# Patient Record
Sex: FEMALE | Race: WHITE | NOT HISPANIC OR LATINO | Employment: OTHER
[De-identification: names, ages, dates, MRNs, and addresses within clinical notes are randomized per-mention and may not be internally consistent; named-entity substitution may affect disease eponyms.]

---

## 2019-07-29 ENCOUNTER — RECORDS - HEALTHEAST (OUTPATIENT)
Dept: ADMINISTRATIVE | Facility: OTHER | Age: 54
End: 2019-07-29

## 2020-07-06 ENCOUNTER — TRANSFERRED RECORDS (OUTPATIENT)
Dept: HEALTH INFORMATION MANAGEMENT | Facility: CLINIC | Age: 55
End: 2020-07-06

## 2021-05-28 ENCOUNTER — RECORDS - HEALTHEAST (OUTPATIENT)
Dept: ADMINISTRATIVE | Facility: CLINIC | Age: 56
End: 2021-05-28

## 2023-01-01 ENCOUNTER — TRANSFERRED RECORDS (OUTPATIENT)
Dept: HEALTH INFORMATION MANAGEMENT | Facility: CLINIC | Age: 58
End: 2023-01-01

## 2023-01-01 ENCOUNTER — PATIENT OUTREACH (OUTPATIENT)
Dept: CARE COORDINATION | Facility: CLINIC | Age: 58
End: 2023-01-01
Payer: COMMERCIAL

## 2023-01-01 ENCOUNTER — HOSPITAL ENCOUNTER (INPATIENT)
Facility: CLINIC | Age: 58
LOS: 2 days | Discharge: HOME | End: 2023-10-25
Attending: EMERGENCY MEDICINE | Admitting: HOSPITALIST
Payer: COMMERCIAL

## 2023-01-01 ENCOUNTER — APPOINTMENT (OUTPATIENT)
Dept: CT IMAGING | Facility: CLINIC | Age: 58
End: 2023-01-01
Attending: EMERGENCY MEDICINE
Payer: COMMERCIAL

## 2023-01-01 ENCOUNTER — APPOINTMENT (OUTPATIENT)
Dept: ULTRASOUND IMAGING | Facility: CLINIC | Age: 58
End: 2023-01-01
Attending: EMERGENCY MEDICINE
Payer: COMMERCIAL

## 2023-01-01 ENCOUNTER — OFFICE VISIT (OUTPATIENT)
Dept: FAMILY MEDICINE | Facility: CLINIC | Age: 58
End: 2023-01-01
Payer: COMMERCIAL

## 2023-01-01 ENCOUNTER — APPOINTMENT (OUTPATIENT)
Dept: PHYSICAL THERAPY | Facility: CLINIC | Age: 58
End: 2023-01-01
Attending: INTERNAL MEDICINE
Payer: COMMERCIAL

## 2023-01-01 ENCOUNTER — HOSPITAL ENCOUNTER (EMERGENCY)
Facility: CLINIC | Age: 58
Discharge: HOME | End: 2023-08-07
Attending: EMERGENCY MEDICINE | Admitting: EMERGENCY MEDICINE
Payer: COMMERCIAL

## 2023-01-01 ENCOUNTER — HOSPITAL ENCOUNTER (OUTPATIENT)
Facility: CLINIC | Age: 58
Setting detail: OBSERVATION
Discharge: HOME | End: 2023-11-13
Attending: EMERGENCY MEDICINE | Admitting: EMERGENCY MEDICINE
Payer: COMMERCIAL

## 2023-01-01 ENCOUNTER — APPOINTMENT (OUTPATIENT)
Dept: RADIOLOGY | Facility: CLINIC | Age: 58
End: 2023-01-01
Attending: HOSPITALIST
Payer: COMMERCIAL

## 2023-01-01 VITALS
BODY MASS INDEX: 31.31 KG/M2 | OXYGEN SATURATION: 94 % | SYSTOLIC BLOOD PRESSURE: 78 MMHG | TEMPERATURE: 98.2 F | WEIGHT: 194 LBS | DIASTOLIC BLOOD PRESSURE: 54 MMHG | RESPIRATION RATE: 16 BRPM | HEART RATE: 92 BPM

## 2023-01-01 VITALS
HEART RATE: 85 BPM | BODY MASS INDEX: 31.64 KG/M2 | SYSTOLIC BLOOD PRESSURE: 123 MMHG | DIASTOLIC BLOOD PRESSURE: 68 MMHG | OXYGEN SATURATION: 92 % | RESPIRATION RATE: 16 BRPM | TEMPERATURE: 98.6 F | WEIGHT: 196 LBS

## 2023-01-01 VITALS
BODY MASS INDEX: 32.14 KG/M2 | HEIGHT: 66 IN | DIASTOLIC BLOOD PRESSURE: 78 MMHG | TEMPERATURE: 97.7 F | HEART RATE: 88 BPM | WEIGHT: 200 LBS | RESPIRATION RATE: 16 BRPM | SYSTOLIC BLOOD PRESSURE: 149 MMHG | OXYGEN SATURATION: 96 %

## 2023-01-01 VITALS
WEIGHT: 195.2 LBS | DIASTOLIC BLOOD PRESSURE: 74 MMHG | SYSTOLIC BLOOD PRESSURE: 141 MMHG | HEART RATE: 100 BPM | TEMPERATURE: 98.4 F | BODY MASS INDEX: 31.51 KG/M2 | RESPIRATION RATE: 18 BRPM | OXYGEN SATURATION: 94 %

## 2023-01-01 DIAGNOSIS — F32.A DEPRESSION, UNSPECIFIED DEPRESSION TYPE: ICD-10-CM

## 2023-01-01 DIAGNOSIS — R31.9 HEMATURIA, UNSPECIFIED TYPE: ICD-10-CM

## 2023-01-01 DIAGNOSIS — E51.9 THIAMINE DEFICIENCY: ICD-10-CM

## 2023-01-01 DIAGNOSIS — R41.0 CONFUSION: Primary | ICD-10-CM

## 2023-01-01 DIAGNOSIS — E03.9 HYPOTHYROIDISM, UNSPECIFIED TYPE: ICD-10-CM

## 2023-01-01 DIAGNOSIS — Z65.8 DOMESTIC CONCERNS: ICD-10-CM

## 2023-01-01 DIAGNOSIS — F51.5 NIGHTMARE: ICD-10-CM

## 2023-01-01 DIAGNOSIS — G30.0: ICD-10-CM

## 2023-01-01 DIAGNOSIS — F03.918 DEMENTIA WITH BEHAVIORAL DISTURBANCE (H): ICD-10-CM

## 2023-01-01 DIAGNOSIS — R45.1 AGITATION: Primary | ICD-10-CM

## 2023-01-01 DIAGNOSIS — R07.9 CHEST PAIN, UNSPECIFIED TYPE: ICD-10-CM

## 2023-01-01 DIAGNOSIS — F02.80 ALZHEIMER'S DEMENTIA, UNSPECIFIED DEMENTIA SEVERITY, UNSPECIFIED TIMING OF DEMENTIA ONSET, UNSPECIFIED WHETHER BEHAVIORAL, PSYCHOTIC, OR MOOD DISTURBANCE OR ANXIETY (H): ICD-10-CM

## 2023-01-01 DIAGNOSIS — R10.84 ABDOMINAL PAIN, GENERALIZED: ICD-10-CM

## 2023-01-01 DIAGNOSIS — Z86.59 HISTORY OF DEMENTIA: ICD-10-CM

## 2023-01-01 DIAGNOSIS — F32.1 CURRENT MODERATE EPISODE OF MAJOR DEPRESSIVE DISORDER WITHOUT PRIOR EPISODE (H): Primary | ICD-10-CM

## 2023-01-01 DIAGNOSIS — N30.00 ACUTE CYSTITIS WITHOUT HEMATURIA: ICD-10-CM

## 2023-01-01 DIAGNOSIS — G30.9 ALZHEIMER'S DEMENTIA, UNSPECIFIED DEMENTIA SEVERITY, UNSPECIFIED TIMING OF DEMENTIA ONSET, UNSPECIFIED WHETHER BEHAVIORAL, PSYCHOTIC, OR MOOD DISTURBANCE OR ANXIETY (H): ICD-10-CM

## 2023-01-01 DIAGNOSIS — F02.83: ICD-10-CM

## 2023-01-01 DIAGNOSIS — I95.9 HYPOTENSION, UNSPECIFIED HYPOTENSION TYPE: ICD-10-CM

## 2023-01-01 DIAGNOSIS — R45.1 AGITATION: ICD-10-CM

## 2023-01-01 LAB
A-TOCOPHEROL VIT E SERPL-MCNC: 8.7 MG/L
ALBUMIN SERPL BCG-MCNC: 4.3 G/DL (ref 3.5–5.2)
ALBUMIN SERPL BCG-MCNC: 4.4 G/DL (ref 3.5–5.2)
ALBUMIN UR-MCNC: NEGATIVE MG/DL
ALBUMIN UR-MCNC: NEGATIVE MG/DL
ALP SERPL-CCNC: 85 U/L (ref 35–104)
ALP SERPL-CCNC: 88 U/L (ref 35–104)
ALT SERPL W P-5'-P-CCNC: 13 U/L (ref 0–50)
ALT SERPL W P-5'-P-CCNC: 16 U/L (ref 0–50)
AMMONIA PLAS-SCNC: 17 UMOL/L (ref 11–51)
AMPHETAMINES UR QL SCN: NORMAL
ANION GAP SERPL CALCULATED.3IONS-SCNC: 10 MMOL/L (ref 7–15)
ANION GAP SERPL CALCULATED.3IONS-SCNC: 11 MMOL/L (ref 7–15)
ANION GAP SERPL CALCULATED.3IONS-SCNC: 12 MMOL/L (ref 7–15)
ANION GAP SERPL CALCULATED.3IONS-SCNC: 9 MMOL/L (ref 7–15)
APPEARANCE UR: CLEAR
APPEARANCE UR: CLEAR
AST SERPL W P-5'-P-CCNC: 18 U/L (ref 0–45)
AST SERPL W P-5'-P-CCNC: 36 U/L (ref 0–45)
ATRIAL RATE - MUSE: 103 BPM
ATRIAL RATE - MUSE: 78 BPM
BACTERIA #/AREA URNS HPF: ABNORMAL /HPF
BACTERIA #/AREA URNS HPF: ABNORMAL /HPF
BACTERIA BLD CULT: NO GROWTH
BACTERIA BLD CULT: NO GROWTH
BACTERIA UR CULT: ABNORMAL
BARBITURATES UR QL SCN: NORMAL
BASE EXCESS BLDV CALC-SCNC: 5.1 MMOL/L
BASO+EOS+MONOS # BLD AUTO: ABNORMAL 10*3/UL
BASO+EOS+MONOS NFR BLD AUTO: ABNORMAL %
BASOPHILS # BLD AUTO: 0 10E3/UL (ref 0–0.2)
BASOPHILS # BLD AUTO: 0 10E3/UL (ref 0–0.2)
BASOPHILS NFR BLD AUTO: 0 %
BASOPHILS NFR BLD AUTO: 0 %
BENZODIAZ UR QL SCN: NORMAL
BETA+GAMMA TOCOPHEROL SERPL-MCNC: 0.5 MG/L
BILIRUB SERPL-MCNC: 0.4 MG/DL
BILIRUB SERPL-MCNC: 0.5 MG/DL
BILIRUB UR QL STRIP: NEGATIVE
BILIRUB UR QL STRIP: NEGATIVE
BUN SERPL-MCNC: 15.3 MG/DL (ref 8–23)
BUN SERPL-MCNC: 16 MG/DL (ref 8–23)
BUN SERPL-MCNC: 16.6 MG/DL (ref 8–23)
BUN SERPL-MCNC: 17.2 MG/DL (ref 8–23)
BZE UR QL SCN: NORMAL
CALCIUM SERPL-MCNC: 10.3 MG/DL (ref 8.6–10)
CALCIUM SERPL-MCNC: 9.1 MG/DL (ref 8.6–10)
CALCIUM SERPL-MCNC: 9.5 MG/DL (ref 8.6–10)
CALCIUM SERPL-MCNC: 9.5 MG/DL (ref 8.6–10)
CANNABINOIDS UR QL SCN: NORMAL
CHLORIDE SERPL-SCNC: 103 MMOL/L (ref 98–107)
CHLORIDE SERPL-SCNC: 103 MMOL/L (ref 98–107)
CHLORIDE SERPL-SCNC: 104 MMOL/L (ref 98–107)
CHLORIDE SERPL-SCNC: 107 MMOL/L (ref 98–107)
COLOR UR AUTO: COLORLESS
COLOR UR AUTO: COLORLESS
CREAT SERPL-MCNC: 1 MG/DL (ref 0.51–0.95)
CREAT SERPL-MCNC: 1.01 MG/DL (ref 0.51–0.95)
CREAT SERPL-MCNC: 1.08 MG/DL (ref 0.51–0.95)
CREAT SERPL-MCNC: 1.09 MG/DL (ref 0.51–0.95)
CREAT SERPL-MCNC: 1.09 MG/DL (ref 0.51–0.95)
CREAT SERPL-MCNC: 1.1 MG/DL (ref 0.51–0.95)
CREAT SERPL-MCNC: 1.14 MG/DL (ref 0.51–0.95)
CRP SERPL-MCNC: 11.4 MG/L
CRP SERPL-MCNC: 11.6 MG/L
CRP SERPL-MCNC: 23.1 MG/L
DEPRECATED HCO3 PLAS-SCNC: 27 MMOL/L (ref 22–29)
DEPRECATED HCO3 PLAS-SCNC: 27 MMOL/L (ref 22–29)
DEPRECATED HCO3 PLAS-SCNC: 28 MMOL/L (ref 22–29)
DEPRECATED HCO3 PLAS-SCNC: 29 MMOL/L (ref 22–29)
DIASTOLIC BLOOD PRESSURE - MUSE: 76 MMHG
DIASTOLIC BLOOD PRESSURE - MUSE: 86 MMHG
EGFRCR SERPLBLD CKD-EPI 2021: 55 ML/MIN/1.73M2
EGFRCR SERPLBLD CKD-EPI 2021: 58 ML/MIN/1.73M2
EGFRCR SERPLBLD CKD-EPI 2021: 59 ML/MIN/1.73M2
EGFRCR SERPLBLD CKD-EPI 2021: 64 ML/MIN/1.73M2
EGFRCR SERPLBLD CKD-EPI 2021: 65 ML/MIN/1.73M2
EOSINOPHIL # BLD AUTO: 0 10E3/UL (ref 0–0.7)
EOSINOPHIL # BLD AUTO: 0 10E3/UL (ref 0–0.7)
EOSINOPHIL NFR BLD AUTO: 0 %
EOSINOPHIL NFR BLD AUTO: 0 %
ERYTHROCYTE [DISTWIDTH] IN BLOOD BY AUTOMATED COUNT: 15.1 % (ref 10–15)
ERYTHROCYTE [DISTWIDTH] IN BLOOD BY AUTOMATED COUNT: 15.2 % (ref 10–15)
ERYTHROCYTE [DISTWIDTH] IN BLOOD BY AUTOMATED COUNT: 15.3 % (ref 10–15)
ERYTHROCYTE [SEDIMENTATION RATE] IN BLOOD BY WESTERGREN METHOD: 12 MM/HR (ref 0–30)
ERYTHROCYTE [SEDIMENTATION RATE] IN BLOOD BY WESTERGREN METHOD: 14 MM/HR (ref 0–30)
ETHANOL SERPL-MCNC: <0.01 G/DL
FENTANYL UR QL: NORMAL
FOLATE SERPL-MCNC: 16.9 NG/ML (ref 4.6–34.8)
GLUCOSE BLDC GLUCOMTR-MCNC: 128 MG/DL (ref 70–99)
GLUCOSE SERPL-MCNC: 101 MG/DL (ref 70–99)
GLUCOSE SERPL-MCNC: 110 MG/DL (ref 70–99)
GLUCOSE SERPL-MCNC: 117 MG/DL (ref 70–99)
GLUCOSE SERPL-MCNC: 93 MG/DL (ref 70–99)
GLUCOSE UR STRIP-MCNC: NEGATIVE MG/DL
GLUCOSE UR STRIP-MCNC: NEGATIVE MG/DL
HCO3 BLDV-SCNC: 30 MMOL/L (ref 24–30)
HCT VFR BLD AUTO: 40.1 % (ref 35–47)
HCT VFR BLD AUTO: 40.9 % (ref 35–47)
HCT VFR BLD AUTO: 41.5 % (ref 35–47)
HCYS SERPL-SCNC: 10.6 UMOL/L (ref 4–12)
HGB BLD-MCNC: 12.7 G/DL (ref 11.7–15.7)
HGB BLD-MCNC: 13.1 G/DL (ref 11.7–15.7)
HGB BLD-MCNC: 13.1 G/DL (ref 11.7–15.7)
HGB UR QL STRIP: NEGATIVE
HGB UR QL STRIP: NEGATIVE
HIV 1+2 AB+HIV1 P24 AG SERPL QL IA: NONREACTIVE
HOLD SPECIMEN: NORMAL
IMM GRANULOCYTES # BLD: 0 10E3/UL
IMM GRANULOCYTES # BLD: 0 10E3/UL
IMM GRANULOCYTES NFR BLD: 0 %
IMM GRANULOCYTES NFR BLD: 0 %
INR PPP: 0.86 (ref 0.85–1.15)
INTERPRETATION ECG - MUSE: NORMAL
INTERPRETATION ECG - MUSE: NORMAL
KETONES UR STRIP-MCNC: NEGATIVE MG/DL
KETONES UR STRIP-MCNC: NEGATIVE MG/DL
LACTATE SERPL-SCNC: 0.7 MMOL/L (ref 0.7–2)
LEUKOCYTE ESTERASE UR QL STRIP: NEGATIVE
LEUKOCYTE ESTERASE UR QL STRIP: NEGATIVE
LIPASE SERPL-CCNC: 39 U/L (ref 13–60)
LYMPHOCYTES # BLD AUTO: 1 10E3/UL (ref 0.8–5.3)
LYMPHOCYTES # BLD AUTO: 1.2 10E3/UL (ref 0.8–5.3)
LYMPHOCYTES NFR BLD AUTO: 13 %
LYMPHOCYTES NFR BLD AUTO: 15 %
MCH RBC QN AUTO: 28.4 PG (ref 26.5–33)
MCH RBC QN AUTO: 28.7 PG (ref 26.5–33)
MCH RBC QN AUTO: 28.9 PG (ref 26.5–33)
MCHC RBC AUTO-ENTMCNC: 31.6 G/DL (ref 31.5–36.5)
MCHC RBC AUTO-ENTMCNC: 31.7 G/DL (ref 31.5–36.5)
MCHC RBC AUTO-ENTMCNC: 32 G/DL (ref 31.5–36.5)
MCV RBC AUTO: 90 FL (ref 78–100)
MCV RBC AUTO: 90 FL (ref 78–100)
MCV RBC AUTO: 91 FL (ref 78–100)
MERCURY BLD-MCNC: <2.5 UG/L
METHYLMALONATE SERPL-SCNC: 0.2 UMOL/L (ref 0–0.4)
MONOCYTES # BLD AUTO: 0.5 10E3/UL (ref 0–1.3)
MONOCYTES # BLD AUTO: 0.7 10E3/UL (ref 0–1.3)
MONOCYTES NFR BLD AUTO: 7 %
MONOCYTES NFR BLD AUTO: 8 %
MUCOUS THREADS #/AREA URNS LPF: PRESENT /LPF
NEUTROPHILS # BLD AUTO: 6.1 10E3/UL (ref 1.6–8.3)
NEUTROPHILS # BLD AUTO: 6.2 10E3/UL (ref 1.6–8.3)
NEUTROPHILS NFR BLD AUTO: 78 %
NEUTROPHILS NFR BLD AUTO: 79 %
NITRATE UR QL: NEGATIVE
NITRATE UR QL: POSITIVE
NRBC # BLD AUTO: 0 10E3/UL
NRBC # BLD AUTO: 0 10E3/UL
NRBC BLD AUTO-RTO: 0 /100
NRBC BLD AUTO-RTO: 0 /100
NT-PROBNP SERPL-MCNC: 41 PG/ML (ref 0–900)
OPIATES UR QL SCN: NORMAL
OXYHGB MFR BLDV: 50.1 % (ref 70–75)
P AXIS - MUSE: 62 DEGREES
P AXIS - MUSE: NORMAL DEGREES
PCO2 BLDV: 49 MM HG (ref 35–50)
PCP QUAL URINE (ROCHE): NORMAL
PH BLDV: 7.4 [PH] (ref 7.35–7.45)
PH UR STRIP: 5 [PH] (ref 5–7)
PH UR STRIP: 5.5 [PH] (ref 5–7)
PHOSPHATE SERPL-MCNC: 3.1 MG/DL (ref 2.5–4.5)
PLATELET # BLD AUTO: 185 10E3/UL (ref 150–450)
PLATELET # BLD AUTO: 187 10E3/UL (ref 150–450)
PLATELET # BLD AUTO: 202 10E3/UL (ref 150–450)
PO2 BLDV: 27 MM HG (ref 25–47)
POTASSIUM SERPL-SCNC: 3.8 MMOL/L (ref 3.4–5.3)
POTASSIUM SERPL-SCNC: 3.9 MMOL/L (ref 3.4–5.3)
POTASSIUM SERPL-SCNC: 4.1 MMOL/L (ref 3.4–5.3)
POTASSIUM SERPL-SCNC: 4.8 MMOL/L (ref 3.4–5.3)
PR INTERVAL - MUSE: 118 MS
PR INTERVAL - MUSE: 152 MS
PROT SERPL-MCNC: 7.2 G/DL (ref 6.4–8.3)
PROT SERPL-MCNC: 7.5 G/DL (ref 6.4–8.3)
QRS DURATION - MUSE: 76 MS
QRS DURATION - MUSE: 76 MS
QT - MUSE: 348 MS
QT - MUSE: 366 MS
QTC - MUSE: 417 MS
QTC - MUSE: 455 MS
R AXIS - MUSE: 51 DEGREES
R AXIS - MUSE: 62 DEGREES
RBC # BLD AUTO: 4.43 10E6/UL (ref 3.8–5.2)
RBC # BLD AUTO: 4.53 10E6/UL (ref 3.8–5.2)
RBC # BLD AUTO: 4.62 10E6/UL (ref 3.8–5.2)
RBC URINE: <1 /HPF
RBC URINE: <1 /HPF
SAO2 % BLDV: 51 % (ref 70–75)
SODIUM SERPL-SCNC: 142 MMOL/L (ref 135–145)
SODIUM SERPL-SCNC: 144 MMOL/L (ref 135–145)
SP GR UR STRIP: 1 (ref 1–1.03)
SP GR UR STRIP: 1.01 (ref 1–1.03)
SQUAMOUS EPITHELIAL: 10 /HPF
SQUAMOUS EPITHELIAL: 2 /HPF
SYSTOLIC BLOOD PRESSURE - MUSE: 156 MMHG
SYSTOLIC BLOOD PRESSURE - MUSE: 162 MMHG
T AXIS - MUSE: 53 DEGREES
T AXIS - MUSE: 58 DEGREES
T PALLIDUM AB SER QL: NONREACTIVE
T4 FREE SERPL-MCNC: 1.05 NG/DL (ref 0.9–1.7)
THYROPEROXIDASE AB SERPL-ACNC: 21 IU/ML
TROPONIN T SERPL HS-MCNC: 16 NG/L
TSH SERPL DL<=0.005 MIU/L-ACNC: 3.84 UIU/ML (ref 0.3–4.2)
TSH SERPL DL<=0.005 MIU/L-ACNC: 8.58 UIU/ML (ref 0.3–4.2)
UROBILINOGEN UR STRIP-MCNC: <2 MG/DL
UROBILINOGEN UR STRIP-MCNC: <2 MG/DL
VENTRICULAR RATE- MUSE: 103 BPM
VENTRICULAR RATE- MUSE: 78 BPM
VIT B1 PYROPHOSHATE BLD-SCNC: 211 NMOL/L
VIT B1 SERPL-SCNC: <2 NMOL/L
VIT B12 SERPL-MCNC: 1322 PG/ML (ref 232–1245)
VIT D+METAB SERPL-MCNC: 51 NG/ML (ref 20–50)
WBC # BLD AUTO: 6.3 10E3/UL (ref 4–11)
WBC # BLD AUTO: 7.9 10E3/UL (ref 4–11)
WBC # BLD AUTO: 7.9 10E3/UL (ref 4–11)
WBC URINE: 1 /HPF
WBC URINE: 2 /HPF

## 2023-01-01 PROCEDURE — G0378 HOSPITAL OBSERVATION PER HR: HCPCS

## 2023-01-01 PROCEDURE — 71046 X-RAY EXAM CHEST 2 VIEWS: CPT

## 2023-01-01 PROCEDURE — 250N000011 HC RX IP 250 OP 636: Performed by: EMERGENCY MEDICINE

## 2023-01-01 PROCEDURE — 250N000013 HC RX MED GY IP 250 OP 250 PS 637: Performed by: INTERNAL MEDICINE

## 2023-01-01 PROCEDURE — 36415 COLL VENOUS BLD VENIPUNCTURE: CPT | Performed by: EMERGENCY MEDICINE

## 2023-01-01 PROCEDURE — 250N000013 HC RX MED GY IP 250 OP 250 PS 637: Performed by: EMERGENCY MEDICINE

## 2023-01-01 PROCEDURE — 86140 C-REACTIVE PROTEIN: CPT | Performed by: HOSPITALIST

## 2023-01-01 PROCEDURE — 82805 BLOOD GASES W/O2 SATURATION: CPT | Performed by: EMERGENCY MEDICINE

## 2023-01-01 PROCEDURE — 82140 ASSAY OF AMMONIA: CPT | Performed by: EMERGENCY MEDICINE

## 2023-01-01 PROCEDURE — 97116 GAIT TRAINING THERAPY: CPT | Mod: GP

## 2023-01-01 PROCEDURE — 250N000013 HC RX MED GY IP 250 OP 250 PS 637: Performed by: REGISTERED NURSE

## 2023-01-01 PROCEDURE — 83690 ASSAY OF LIPASE: CPT | Performed by: STUDENT IN AN ORGANIZED HEALTH CARE EDUCATION/TRAINING PROGRAM

## 2023-01-01 PROCEDURE — 82565 ASSAY OF CREATININE: CPT | Performed by: EMERGENCY MEDICINE

## 2023-01-01 PROCEDURE — 99233 SBSQ HOSP IP/OBS HIGH 50: CPT | Performed by: HOSPITALIST

## 2023-01-01 PROCEDURE — 250N000011 HC RX IP 250 OP 636: Mod: JZ | Performed by: STUDENT IN AN ORGANIZED HEALTH CARE EDUCATION/TRAINING PROGRAM

## 2023-01-01 PROCEDURE — 96372 THER/PROPH/DIAG INJ SC/IM: CPT | Performed by: EMERGENCY MEDICINE

## 2023-01-01 PROCEDURE — 36415 COLL VENOUS BLD VENIPUNCTURE: CPT | Performed by: HOSPITALIST

## 2023-01-01 PROCEDURE — 99223 1ST HOSP IP/OBS HIGH 75: CPT | Performed by: HOSPITALIST

## 2023-01-01 PROCEDURE — 99285 EMERGENCY DEPT VISIT HI MDM: CPT | Mod: 25

## 2023-01-01 PROCEDURE — 80048 BASIC METABOLIC PNL TOTAL CA: CPT | Performed by: HOSPITALIST

## 2023-01-01 PROCEDURE — 250N000011 HC RX IP 250 OP 636: Mod: JZ | Performed by: HOSPITALIST

## 2023-01-01 PROCEDURE — 83880 ASSAY OF NATRIURETIC PEPTIDE: CPT | Performed by: STUDENT IN AN ORGANIZED HEALTH CARE EDUCATION/TRAINING PROGRAM

## 2023-01-01 PROCEDURE — 84446 ASSAY OF VITAMIN E: CPT | Performed by: HOSPITALIST

## 2023-01-01 PROCEDURE — 80053 COMPREHEN METABOLIC PANEL: CPT | Performed by: STUDENT IN AN ORGANIZED HEALTH CARE EDUCATION/TRAINING PROGRAM

## 2023-01-01 PROCEDURE — 84439 ASSAY OF FREE THYROXINE: CPT | Performed by: EMERGENCY MEDICINE

## 2023-01-01 PROCEDURE — 83090 ASSAY OF HOMOCYSTEINE: CPT | Performed by: HOSPITALIST

## 2023-01-01 PROCEDURE — 81001 URINALYSIS AUTO W/SCOPE: CPT | Performed by: STUDENT IN AN ORGANIZED HEALTH CARE EDUCATION/TRAINING PROGRAM

## 2023-01-01 PROCEDURE — 99232 SBSQ HOSP IP/OBS MODERATE 35: CPT | Performed by: EMERGENCY MEDICINE

## 2023-01-01 PROCEDURE — 250N000013 HC RX MED GY IP 250 OP 250 PS 637: Performed by: HOSPITALIST

## 2023-01-01 PROCEDURE — 85652 RBC SED RATE AUTOMATED: CPT | Performed by: EMERGENCY MEDICINE

## 2023-01-01 PROCEDURE — 96376 TX/PRO/DX INJ SAME DRUG ADON: CPT

## 2023-01-01 PROCEDURE — 36415 COLL VENOUS BLD VENIPUNCTURE: CPT | Performed by: STUDENT IN AN ORGANIZED HEALTH CARE EDUCATION/TRAINING PROGRAM

## 2023-01-01 PROCEDURE — 93005 ELECTROCARDIOGRAM TRACING: CPT | Performed by: EMERGENCY MEDICINE

## 2023-01-01 PROCEDURE — 70450 CT HEAD/BRAIN W/O DYE: CPT

## 2023-01-01 PROCEDURE — 86376 MICROSOMAL ANTIBODY EACH: CPT | Performed by: HOSPITALIST

## 2023-01-01 PROCEDURE — 85004 AUTOMATED DIFF WBC COUNT: CPT | Performed by: STUDENT IN AN ORGANIZED HEALTH CARE EDUCATION/TRAINING PROGRAM

## 2023-01-01 PROCEDURE — 87389 HIV-1 AG W/HIV-1&-2 AB AG IA: CPT | Performed by: HOSPITALIST

## 2023-01-01 PROCEDURE — 97162 PT EVAL MOD COMPLEX 30 MIN: CPT | Mod: GP

## 2023-01-01 PROCEDURE — 85025 COMPLETE CBC W/AUTO DIFF WBC: CPT | Performed by: EMERGENCY MEDICINE

## 2023-01-01 PROCEDURE — 96374 THER/PROPH/DIAG INJ IV PUSH: CPT

## 2023-01-01 PROCEDURE — 99204 OFFICE O/P NEW MOD 45 MIN: CPT | Performed by: FAMILY MEDICINE

## 2023-01-01 PROCEDURE — 99239 HOSP IP/OBS DSCHRG MGMT >30: CPT | Performed by: INTERNAL MEDICINE

## 2023-01-01 PROCEDURE — 83825 ASSAY OF MERCURY: CPT | Performed by: HOSPITALIST

## 2023-01-01 PROCEDURE — 82077 ASSAY SPEC XCP UR&BREATH IA: CPT | Performed by: EMERGENCY MEDICINE

## 2023-01-01 PROCEDURE — 76705 ECHO EXAM OF ABDOMEN: CPT

## 2023-01-01 PROCEDURE — 84425 ASSAY OF VITAMIN B-1: CPT | Performed by: HOSPITALIST

## 2023-01-01 PROCEDURE — 84484 ASSAY OF TROPONIN QUANT: CPT | Performed by: STUDENT IN AN ORGANIZED HEALTH CARE EDUCATION/TRAINING PROGRAM

## 2023-01-01 PROCEDURE — 82962 GLUCOSE BLOOD TEST: CPT

## 2023-01-01 PROCEDURE — 250N000011 HC RX IP 250 OP 636: Mod: JZ | Performed by: EMERGENCY MEDICINE

## 2023-01-01 PROCEDURE — 84100 ASSAY OF PHOSPHORUS: CPT | Performed by: HOSPITALIST

## 2023-01-01 PROCEDURE — 85014 HEMATOCRIT: CPT | Performed by: HOSPITALIST

## 2023-01-01 PROCEDURE — 83921 ORGANIC ACID SINGLE QUANT: CPT | Performed by: HOSPITALIST

## 2023-01-01 PROCEDURE — 82306 VITAMIN D 25 HYDROXY: CPT | Performed by: EMERGENCY MEDICINE

## 2023-01-01 PROCEDURE — 250N000013 HC RX MED GY IP 250 OP 250 PS 637: Performed by: STUDENT IN AN ORGANIZED HEALTH CARE EDUCATION/TRAINING PROGRAM

## 2023-01-01 PROCEDURE — 81001 URINALYSIS AUTO W/SCOPE: CPT | Performed by: EMERGENCY MEDICINE

## 2023-01-01 PROCEDURE — 250N000011 HC RX IP 250 OP 636: Mod: JZ | Performed by: REGISTERED NURSE

## 2023-01-01 PROCEDURE — 80053 COMPREHEN METABOLIC PANEL: CPT | Performed by: EMERGENCY MEDICINE

## 2023-01-01 PROCEDURE — 99232 SBSQ HOSP IP/OBS MODERATE 35: CPT | Performed by: HOSPITALIST

## 2023-01-01 PROCEDURE — 99222 1ST HOSP IP/OBS MODERATE 55: CPT | Mod: AI | Performed by: REGISTERED NURSE

## 2023-01-01 PROCEDURE — 90791 PSYCH DIAGNOSTIC EVALUATION: CPT

## 2023-01-01 PROCEDURE — 84443 ASSAY THYROID STIM HORMONE: CPT | Performed by: HOSPITALIST

## 2023-01-01 PROCEDURE — 74177 CT ABD & PELVIS W/CONTRAST: CPT

## 2023-01-01 PROCEDURE — 82607 VITAMIN B-12: CPT | Performed by: HOSPITALIST

## 2023-01-01 PROCEDURE — 99232 SBSQ HOSP IP/OBS MODERATE 35: CPT | Performed by: INTERNAL MEDICINE

## 2023-01-01 PROCEDURE — 99232 SBSQ HOSP IP/OBS MODERATE 35: CPT | Performed by: STUDENT IN AN ORGANIZED HEALTH CARE EDUCATION/TRAINING PROGRAM

## 2023-01-01 PROCEDURE — 82746 ASSAY OF FOLIC ACID SERUM: CPT | Performed by: HOSPITALIST

## 2023-01-01 PROCEDURE — 87040 BLOOD CULTURE FOR BACTERIA: CPT | Performed by: STUDENT IN AN ORGANIZED HEALTH CARE EDUCATION/TRAINING PROGRAM

## 2023-01-01 PROCEDURE — 96375 TX/PRO/DX INJ NEW DRUG ADDON: CPT

## 2023-01-01 PROCEDURE — 84443 ASSAY THYROID STIM HORMONE: CPT | Performed by: EMERGENCY MEDICINE

## 2023-01-01 PROCEDURE — 80307 DRUG TEST PRSMV CHEM ANLYZR: CPT | Performed by: EMERGENCY MEDICINE

## 2023-01-01 PROCEDURE — 99222 1ST HOSP IP/OBS MODERATE 55: CPT | Performed by: EMERGENCY MEDICINE

## 2023-01-01 PROCEDURE — 84425 ASSAY OF VITAMIN B-1: CPT | Performed by: EMERGENCY MEDICINE

## 2023-01-01 PROCEDURE — 83605 ASSAY OF LACTIC ACID: CPT | Performed by: STUDENT IN AN ORGANIZED HEALTH CARE EDUCATION/TRAINING PROGRAM

## 2023-01-01 PROCEDURE — 120N000001 HC R&B MED SURG/OB

## 2023-01-01 PROCEDURE — 85652 RBC SED RATE AUTOMATED: CPT | Performed by: HOSPITALIST

## 2023-01-01 PROCEDURE — 99239 HOSP IP/OBS DSCHRG MGMT >30: CPT | Performed by: STUDENT IN AN ORGANIZED HEALTH CARE EDUCATION/TRAINING PROGRAM

## 2023-01-01 PROCEDURE — 99233 SBSQ HOSP IP/OBS HIGH 50: CPT | Performed by: REGISTERED NURSE

## 2023-01-01 PROCEDURE — 86780 TREPONEMA PALLIDUM: CPT | Performed by: HOSPITALIST

## 2023-01-01 PROCEDURE — 87186 SC STD MICRODIL/AGAR DIL: CPT | Performed by: EMERGENCY MEDICINE

## 2023-01-01 PROCEDURE — 85610 PROTHROMBIN TIME: CPT | Performed by: EMERGENCY MEDICINE

## 2023-01-01 RX ORDER — MEMANTINE HYDROCHLORIDE 5 MG/1
2 TABLET ORAL 2 TIMES DAILY
COMMUNITY
Start: 2021-08-18 | End: 2023-01-01

## 2023-01-01 RX ORDER — PRAZOSIN HYDROCHLORIDE 1 MG/1
1 CAPSULE ORAL AT BEDTIME
Qty: 30 CAPSULE | Refills: 3 | Status: SHIPPED | OUTPATIENT
Start: 2023-01-01

## 2023-01-01 RX ORDER — OLANZAPINE 5 MG/1
2.5 TABLET ORAL DAILY PRN
Status: ON HOLD | COMMUNITY
End: 2023-01-01

## 2023-01-01 RX ORDER — UREA 10 %
500 LOTION (ML) TOPICAL DAILY
Status: DISCONTINUED | OUTPATIENT
Start: 2023-01-01 | End: 2023-01-01 | Stop reason: HOSPADM

## 2023-01-01 RX ORDER — ONDANSETRON 2 MG/ML
4 INJECTION INTRAMUSCULAR; INTRAVENOUS EVERY 6 HOURS PRN
Status: DISCONTINUED | OUTPATIENT
Start: 2023-01-01 | End: 2023-01-01 | Stop reason: HOSPADM

## 2023-01-01 RX ORDER — QUETIAPINE FUMARATE 25 MG/1
25 TABLET, FILM COATED ORAL AT BEDTIME
Status: DISCONTINUED | OUTPATIENT
Start: 2023-01-01 | End: 2023-01-01

## 2023-01-01 RX ORDER — DONEPEZIL HYDROCHLORIDE 5 MG/1
5 TABLET, FILM COATED ORAL AT BEDTIME
Status: DISCONTINUED | OUTPATIENT
Start: 2023-01-01 | End: 2023-01-01 | Stop reason: HOSPADM

## 2023-01-01 RX ORDER — CEPHALEXIN 500 MG/1
500 CAPSULE ORAL EVERY 12 HOURS
Qty: 1 CAPSULE | Refills: 0 | Status: SHIPPED | OUTPATIENT
Start: 2023-01-01 | End: 2023-01-01

## 2023-01-01 RX ORDER — HALOPERIDOL 5 MG/ML
2 INJECTION INTRAMUSCULAR DAILY PRN
Status: DISCONTINUED | OUTPATIENT
Start: 2023-01-01 | End: 2023-01-01

## 2023-01-01 RX ORDER — PRAZOSIN HYDROCHLORIDE 1 MG/1
1 CAPSULE ORAL AT BEDTIME
Status: DISCONTINUED | OUTPATIENT
Start: 2023-01-01 | End: 2023-01-01 | Stop reason: HOSPADM

## 2023-01-01 RX ORDER — LORAZEPAM 2 MG/ML
1 INJECTION INTRAMUSCULAR EVERY 4 HOURS PRN
Status: DISCONTINUED | OUTPATIENT
Start: 2023-01-01 | End: 2023-01-01 | Stop reason: HOSPADM

## 2023-01-01 RX ORDER — ACETAMINOPHEN 325 MG/1
650 TABLET ORAL EVERY 4 HOURS PRN
Status: DISCONTINUED | OUTPATIENT
Start: 2023-01-01 | End: 2023-01-01 | Stop reason: HOSPADM

## 2023-01-01 RX ORDER — OLANZAPINE 5 MG/1
5 TABLET, ORALLY DISINTEGRATING ORAL 2 TIMES DAILY
Status: DISCONTINUED | OUTPATIENT
Start: 2023-01-01 | End: 2023-01-01 | Stop reason: HOSPADM

## 2023-01-01 RX ORDER — BUPROPION HYDROCHLORIDE 150 MG/1
150 TABLET, EXTENDED RELEASE ORAL
COMMUNITY
End: 2023-01-01

## 2023-01-01 RX ORDER — SERTRALINE HYDROCHLORIDE 25 MG/1
25 TABLET, FILM COATED ORAL DAILY
Status: DISCONTINUED | OUTPATIENT
Start: 2023-01-01 | End: 2023-01-01 | Stop reason: HOSPADM

## 2023-01-01 RX ORDER — SERTRALINE HYDROCHLORIDE 25 MG/1
25 TABLET, FILM COATED ORAL DAILY
Qty: 30 TABLET | Refills: 0 | Status: SHIPPED | OUTPATIENT
Start: 2023-01-01

## 2023-01-01 RX ORDER — LANOLIN ALCOHOL/MO/W.PET/CERES
3 CREAM (GRAM) TOPICAL AT BEDTIME
Status: DISCONTINUED | OUTPATIENT
Start: 2023-01-01 | End: 2023-01-01 | Stop reason: HOSPADM

## 2023-01-01 RX ORDER — LORAZEPAM 2 MG/ML
1 INJECTION INTRAMUSCULAR ONCE
Status: COMPLETED | OUTPATIENT
Start: 2023-01-01 | End: 2023-01-01

## 2023-01-01 RX ORDER — LANOLIN ALCOHOL/MO/W.PET/CERES
3 CREAM (GRAM) TOPICAL
Qty: 30 TABLET | Refills: 0 | Status: SHIPPED | OUTPATIENT
Start: 2023-01-01

## 2023-01-01 RX ORDER — B-COMPLEX WITH VITAMIN C
500 TABLET ORAL DAILY
Status: DISCONTINUED | OUTPATIENT
Start: 2023-01-01 | End: 2023-01-01

## 2023-01-01 RX ORDER — ENOXAPARIN SODIUM 100 MG/ML
40 INJECTION SUBCUTANEOUS EVERY 24 HOURS
Status: DISCONTINUED | OUTPATIENT
Start: 2023-01-01 | End: 2023-01-01 | Stop reason: HOSPADM

## 2023-01-01 RX ORDER — POLYETHYLENE GLYCOL 3350 17 G/17G
17 POWDER, FOR SOLUTION ORAL DAILY
Status: DISCONTINUED | OUTPATIENT
Start: 2023-01-01 | End: 2023-01-01 | Stop reason: HOSPADM

## 2023-01-01 RX ORDER — B-COMPLEX WITH VITAMIN C
500 TABLET ORAL DAILY
Qty: 36 TABLET | Refills: 1 | Status: SHIPPED | OUTPATIENT
Start: 2023-01-01 | End: 2023-01-01

## 2023-01-01 RX ORDER — QUETIAPINE FUMARATE 25 MG/1
25 TABLET, FILM COATED ORAL AT BEDTIME
Qty: 45 TABLET | Refills: 3 | Status: SHIPPED | OUTPATIENT
Start: 2023-01-01 | End: 2023-01-01

## 2023-01-01 RX ORDER — IOPAMIDOL 755 MG/ML
75 INJECTION, SOLUTION INTRAVASCULAR ONCE
Status: COMPLETED | OUTPATIENT
Start: 2023-01-01 | End: 2023-01-01

## 2023-01-01 RX ORDER — QUETIAPINE FUMARATE 50 MG/1
50 TABLET, FILM COATED ORAL AT BEDTIME
Status: DISCONTINUED | OUTPATIENT
Start: 2023-01-01 | End: 2023-01-01

## 2023-01-01 RX ORDER — PANTOPRAZOLE SODIUM 40 MG/1
40 TABLET, DELAYED RELEASE ORAL DAILY
Status: DISCONTINUED | OUTPATIENT
Start: 2023-01-01 | End: 2023-01-01 | Stop reason: HOSPADM

## 2023-01-01 RX ORDER — BISACODYL 5 MG
5 TABLET, DELAYED RELEASE (ENTERIC COATED) ORAL DAILY PRN
Status: DISCONTINUED | OUTPATIENT
Start: 2023-01-01 | End: 2023-01-01 | Stop reason: HOSPADM

## 2023-01-01 RX ORDER — ONDANSETRON 4 MG/1
4 TABLET, ORALLY DISINTEGRATING ORAL EVERY 6 HOURS PRN
Status: DISCONTINUED | OUTPATIENT
Start: 2023-01-01 | End: 2023-01-01 | Stop reason: HOSPADM

## 2023-01-01 RX ORDER — VITAMIN B COMPLEX
25 TABLET ORAL DAILY
Status: DISCONTINUED | OUTPATIENT
Start: 2023-01-01 | End: 2023-01-01 | Stop reason: HOSPADM

## 2023-01-01 RX ORDER — OLANZAPINE 5 MG/1
5 TABLET ORAL AT BEDTIME
COMMUNITY

## 2023-01-01 RX ORDER — MEMANTINE HYDROCHLORIDE 5 MG/1
10 TABLET ORAL 2 TIMES DAILY
Status: DISCONTINUED | OUTPATIENT
Start: 2023-01-01 | End: 2023-01-01 | Stop reason: HOSPADM

## 2023-01-01 RX ORDER — OLANZAPINE 5 MG/1
5 TABLET, ORALLY DISINTEGRATING ORAL AT BEDTIME
Status: DISCONTINUED | OUTPATIENT
Start: 2023-01-01 | End: 2023-01-01

## 2023-01-01 RX ORDER — ZIPRASIDONE MESYLATE 20 MG/ML
10 INJECTION, POWDER, LYOPHILIZED, FOR SOLUTION INTRAMUSCULAR ONCE
Status: COMPLETED | OUTPATIENT
Start: 2023-01-01 | End: 2023-01-01

## 2023-01-01 RX ORDER — DONEPEZIL HYDROCHLORIDE 5 MG/1
5 TABLET, FILM COATED ORAL AT BEDTIME
Qty: 30 TABLET | Refills: 0 | Status: SHIPPED | OUTPATIENT
Start: 2023-01-01

## 2023-01-01 RX ORDER — ASPIRIN 81 MG/1
81 TABLET ORAL DAILY
COMMUNITY

## 2023-01-01 RX ORDER — LANOLIN ALCOHOL/MO/W.PET/CERES
3 CREAM (GRAM) TOPICAL
Qty: 30 TABLET | Refills: 3 | Status: SHIPPED | OUTPATIENT
Start: 2023-01-01

## 2023-01-01 RX ORDER — MIRTAZAPINE 15 MG/1
15 TABLET, FILM COATED ORAL AT BEDTIME
Status: DISCONTINUED | OUTPATIENT
Start: 2023-01-01 | End: 2023-01-01 | Stop reason: HOSPADM

## 2023-01-01 RX ORDER — PANTOPRAZOLE SODIUM 40 MG/1
40 TABLET, DELAYED RELEASE ORAL
Status: DISCONTINUED | OUTPATIENT
Start: 2023-01-01 | End: 2023-01-01 | Stop reason: HOSPADM

## 2023-01-01 RX ORDER — VITAMIN B COMPLEX
25 TABLET ORAL DAILY
Status: DISCONTINUED | OUTPATIENT
Start: 2023-01-01 | End: 2023-01-01

## 2023-01-01 RX ORDER — ASPIRIN 81 MG/1
81 TABLET ORAL DAILY
Status: DISCONTINUED | OUTPATIENT
Start: 2023-01-01 | End: 2023-01-01 | Stop reason: HOSPADM

## 2023-01-01 RX ORDER — OLANZAPINE 10 MG/2ML
2.5 INJECTION, POWDER, FOR SOLUTION INTRAMUSCULAR 2 TIMES DAILY PRN
Status: DISCONTINUED | OUTPATIENT
Start: 2023-01-01 | End: 2023-01-01 | Stop reason: HOSPADM

## 2023-01-01 RX ORDER — MIRTAZAPINE 15 MG/1
15 TABLET, FILM COATED ORAL AT BEDTIME
Status: ON HOLD | COMMUNITY
Start: 2023-01-01 | End: 2023-01-01

## 2023-01-01 RX ORDER — HYDROXYZINE HYDROCHLORIDE 10 MG/1
10 TABLET, FILM COATED ORAL DAILY PRN
Status: COMPLETED | OUTPATIENT
Start: 2023-01-01 | End: 2023-01-01

## 2023-01-01 RX ORDER — OLANZAPINE 2.5 MG/1
2.5 TABLET, FILM COATED ORAL ONCE
Status: COMPLETED | OUTPATIENT
Start: 2023-01-01 | End: 2023-01-01

## 2023-01-01 RX ORDER — OLANZAPINE 5 MG/1
2.5-5 TABLET ORAL 2 TIMES DAILY PRN
Qty: 30 TABLET | Refills: 0 | Status: SHIPPED | OUTPATIENT
Start: 2023-01-01

## 2023-01-01 RX ORDER — LORAZEPAM 2 MG/ML
0.5 INJECTION INTRAMUSCULAR ONCE
Status: DISCONTINUED | OUTPATIENT
Start: 2023-01-01 | End: 2023-01-01

## 2023-01-01 RX ORDER — HYDROXYZINE HYDROCHLORIDE 10 MG/1
10 TABLET, FILM COATED ORAL 2 TIMES DAILY PRN
Status: DISCONTINUED | OUTPATIENT
Start: 2023-01-01 | End: 2023-01-01

## 2023-01-01 RX ORDER — LORAZEPAM 0.5 MG/1
TABLET ORAL
COMMUNITY
Start: 2023-01-01 | End: 2023-01-01

## 2023-01-01 RX ORDER — QUETIAPINE FUMARATE 25 MG/1
12.5 TABLET, FILM COATED ORAL 2 TIMES DAILY
Qty: 15 TABLET | Refills: 3 | Status: SHIPPED | OUTPATIENT
Start: 2023-01-01 | End: 2023-01-01

## 2023-01-01 RX ORDER — HYDROXYZINE HYDROCHLORIDE 10 MG/1
10 TABLET, FILM COATED ORAL
Status: COMPLETED | OUTPATIENT
Start: 2023-01-01 | End: 2023-01-01

## 2023-01-01 RX ORDER — CEPHALEXIN 500 MG/1
500 CAPSULE ORAL EVERY 12 HOURS SCHEDULED
Status: DISCONTINUED | OUTPATIENT
Start: 2023-01-01 | End: 2023-01-01 | Stop reason: HOSPADM

## 2023-01-01 RX ORDER — MIRTAZAPINE 15 MG/1
15 TABLET, FILM COATED ORAL AT BEDTIME
Status: DISCONTINUED | OUTPATIENT
Start: 2023-01-01 | End: 2023-01-01

## 2023-01-01 RX ORDER — OLANZAPINE 10 MG/2ML
2.5 INJECTION, POWDER, FOR SOLUTION INTRAMUSCULAR ONCE
Status: COMPLETED | OUTPATIENT
Start: 2023-01-01 | End: 2023-01-01

## 2023-01-01 RX ORDER — MEMANTINE HYDROCHLORIDE 10 MG/1
10 TABLET ORAL 2 TIMES DAILY
Status: DISCONTINUED | OUTPATIENT
Start: 2023-01-01 | End: 2023-01-01 | Stop reason: HOSPADM

## 2023-01-01 RX ORDER — HALOPERIDOL 5 MG/ML
5 INJECTION INTRAMUSCULAR EVERY 6 HOURS PRN
Status: DISCONTINUED | OUTPATIENT
Start: 2023-01-01 | End: 2023-01-01

## 2023-01-01 RX ORDER — OLANZAPINE 5 MG/1
5 TABLET ORAL AT BEDTIME
Status: DISCONTINUED | OUTPATIENT
Start: 2023-01-01 | End: 2023-01-01 | Stop reason: HOSPADM

## 2023-01-01 RX ORDER — QUETIAPINE FUMARATE 25 MG/1
TABLET, FILM COATED ORAL
Status: ON HOLD | COMMUNITY
End: 2023-01-01

## 2023-01-01 RX ORDER — CEFTRIAXONE 1 G/1
1 INJECTION, POWDER, FOR SOLUTION INTRAMUSCULAR; INTRAVENOUS EVERY 24 HOURS
Status: DISCONTINUED | OUTPATIENT
Start: 2023-01-01 | End: 2023-01-01

## 2023-01-01 RX ORDER — LANOLIN ALCOHOL/MO/W.PET/CERES
3 CREAM (GRAM) TOPICAL
Status: DISCONTINUED | OUTPATIENT
Start: 2023-01-01 | End: 2023-01-01 | Stop reason: HOSPADM

## 2023-01-01 RX ORDER — ACETAMINOPHEN 325 MG/1
975 TABLET ORAL EVERY 8 HOURS
Status: DISCONTINUED | OUTPATIENT
Start: 2023-01-01 | End: 2023-01-01 | Stop reason: HOSPADM

## 2023-01-01 RX ORDER — MEMANTINE HYDROCHLORIDE 10 MG/1
10 TABLET ORAL 2 TIMES DAILY
COMMUNITY

## 2023-01-01 RX ORDER — LANOLIN ALCOHOL/MO/W.PET/CERES
100 CREAM (GRAM) TOPICAL DAILY
Qty: 30 TABLET | Refills: 1 | Status: SHIPPED | OUTPATIENT
Start: 2023-01-01

## 2023-01-01 RX ORDER — UREA 10 %
500 LOTION (ML) TOPICAL DAILY
COMMUNITY

## 2023-01-01 RX ADMIN — QUETIAPINE FUMARATE 12.5 MG: 25 TABLET ORAL at 19:38

## 2023-01-01 RX ADMIN — Medication 25 MCG: at 16:52

## 2023-01-01 RX ADMIN — Medication 25 MCG: at 07:20

## 2023-01-01 RX ADMIN — QUETIAPINE FUMARATE 12.5 MG: 25 TABLET ORAL at 09:33

## 2023-01-01 RX ADMIN — LORAZEPAM 1 MG: 2 INJECTION INTRAMUSCULAR; INTRAVENOUS at 13:11

## 2023-01-01 RX ADMIN — PRAZOSIN HYDROCHLORIDE 1 MG: 1 CAPSULE ORAL at 21:36

## 2023-01-01 RX ADMIN — ACETAMINOPHEN 975 MG: 325 TABLET ORAL at 08:53

## 2023-01-01 RX ADMIN — OLANZAPINE 2.5 MG: 5 TABLET, ORALLY DISINTEGRATING ORAL at 11:23

## 2023-01-01 RX ADMIN — ASPIRIN 81 MG: 81 TABLET, COATED ORAL at 16:52

## 2023-01-01 RX ADMIN — ASPIRIN 81 MG: 81 TABLET, COATED ORAL at 07:20

## 2023-01-01 RX ADMIN — PRAZOSIN HYDROCHLORIDE 1 MG: 1 CAPSULE ORAL at 02:48

## 2023-01-01 RX ADMIN — ACETAMINOPHEN 975 MG: 325 TABLET ORAL at 08:52

## 2023-01-01 RX ADMIN — MIRTAZAPINE 15 MG: 15 TABLET, FILM COATED ORAL at 22:58

## 2023-01-01 RX ADMIN — ACETAMINOPHEN 975 MG: 325 TABLET ORAL at 23:47

## 2023-01-01 RX ADMIN — HALOPERIDOL LACTATE 5 MG: 5 INJECTION, SOLUTION INTRAMUSCULAR at 14:22

## 2023-01-01 RX ADMIN — MEMANTINE 10 MG: 10 TABLET ORAL at 09:33

## 2023-01-01 RX ADMIN — OLANZAPINE 2.5 MG: 10 INJECTION, POWDER, FOR SOLUTION INTRAMUSCULAR at 17:38

## 2023-01-01 RX ADMIN — MEMANTINE 10 MG: 10 TABLET ORAL at 19:25

## 2023-01-01 RX ADMIN — CYANOCOBALAMIN TAB 500 MCG 500 MCG: 500 TAB at 16:53

## 2023-01-01 RX ADMIN — QUETIAPINE FUMARATE 12.5 MG: 25 TABLET ORAL at 07:20

## 2023-01-01 RX ADMIN — QUETIAPINE FUMARATE 12.5 MG: 25 TABLET ORAL at 19:31

## 2023-01-01 RX ADMIN — PANTOPRAZOLE SODIUM 40 MG: 40 TABLET, DELAYED RELEASE ORAL at 09:16

## 2023-01-01 RX ADMIN — DONEPEZIL HYDROCHLORIDE 5 MG: 5 TABLET, FILM COATED ORAL at 22:09

## 2023-01-01 RX ADMIN — MEMANTINE 10 MG: 10 TABLET ORAL at 19:31

## 2023-01-01 RX ADMIN — DONEPEZIL HYDROCHLORIDE 5 MG: 5 TABLET, FILM COATED ORAL at 21:37

## 2023-01-01 RX ADMIN — OLANZAPINE 5 MG: 5 TABLET, ORALLY DISINTEGRATING ORAL at 17:15

## 2023-01-01 RX ADMIN — OLANZAPINE 5 MG: 5 TABLET, ORALLY DISINTEGRATING ORAL at 08:14

## 2023-01-01 RX ADMIN — POLYETHYLENE GLYCOL 3350 17 G: 17 POWDER, FOR SOLUTION ORAL at 09:19

## 2023-01-01 RX ADMIN — MEMANTINE 10 MG: 10 TABLET ORAL at 08:53

## 2023-01-01 RX ADMIN — Medication 3 MG: at 19:32

## 2023-01-01 RX ADMIN — OLANZAPINE 5 MG: 5 TABLET, ORALLY DISINTEGRATING ORAL at 21:36

## 2023-01-01 RX ADMIN — MEMANTINE 10 MG: 10 TABLET ORAL at 08:52

## 2023-01-01 RX ADMIN — OLANZAPINE 2.5 MG: 5 TABLET, ORALLY DISINTEGRATING ORAL at 15:46

## 2023-01-01 RX ADMIN — Medication 25 MCG: at 08:53

## 2023-01-01 RX ADMIN — Medication 100 MG: at 07:59

## 2023-01-01 RX ADMIN — MEMANTINE 10 MG: 10 TABLET ORAL at 08:00

## 2023-01-01 RX ADMIN — CEFTRIAXONE 1 G: 1 INJECTION, POWDER, FOR SOLUTION INTRAMUSCULAR; INTRAVENOUS at 09:03

## 2023-01-01 RX ADMIN — ASPIRIN 81 MG: 81 TABLET, COATED ORAL at 09:17

## 2023-01-01 RX ADMIN — MEMANTINE 10 MG: 10 TABLET ORAL at 21:09

## 2023-01-01 RX ADMIN — QUETIAPINE FUMARATE 12.5 MG: 25 TABLET ORAL at 01:41

## 2023-01-01 RX ADMIN — OLANZAPINE 2.5 MG: 10 INJECTION, POWDER, FOR SOLUTION INTRAMUSCULAR at 17:37

## 2023-01-01 RX ADMIN — PANTOPRAZOLE SODIUM 40 MG: 40 TABLET, DELAYED RELEASE ORAL at 07:59

## 2023-01-01 RX ADMIN — DONEPEZIL HYDROCHLORIDE 5 MG: 5 TABLET, FILM COATED ORAL at 19:25

## 2023-01-01 RX ADMIN — OLANZAPINE 5 MG: 5 TABLET, ORALLY DISINTEGRATING ORAL at 09:00

## 2023-01-01 RX ADMIN — MIRTAZAPINE 15 MG: 15 TABLET, FILM COATED ORAL at 21:09

## 2023-01-01 RX ADMIN — MEMANTINE 10 MG: 10 TABLET ORAL at 20:27

## 2023-01-01 RX ADMIN — Medication 100 MG: at 08:00

## 2023-01-01 RX ADMIN — OLANZAPINE 5 MG: 5 TABLET, ORALLY DISINTEGRATING ORAL at 20:58

## 2023-01-01 RX ADMIN — CEFTRIAXONE 1 G: 1 INJECTION, POWDER, FOR SOLUTION INTRAMUSCULAR; INTRAVENOUS at 09:33

## 2023-01-01 RX ADMIN — Medication 1 MG: at 22:36

## 2023-01-01 RX ADMIN — QUETIAPINE FUMARATE 12.5 MG: 25 TABLET ORAL at 19:19

## 2023-01-01 RX ADMIN — QUETIAPINE FUMARATE 12.5 MG: 25 TABLET ORAL at 08:53

## 2023-01-01 RX ADMIN — QUETIAPINE FUMARATE 12.5 MG: 25 TABLET ORAL at 08:52

## 2023-01-01 RX ADMIN — ACETAMINOPHEN 650 MG: 325 TABLET ORAL at 23:55

## 2023-01-01 RX ADMIN — OLANZAPINE 5 MG: 5 TABLET, FILM COATED ORAL at 22:14

## 2023-01-01 RX ADMIN — CYANOCOBALAMIN TAB 500 MCG 500 MCG: 500 TAB at 08:57

## 2023-01-01 RX ADMIN — ASPIRIN 81 MG: 81 TABLET, COATED ORAL at 08:53

## 2023-01-01 RX ADMIN — MEMANTINE 10 MG: 10 TABLET ORAL at 09:16

## 2023-01-01 RX ADMIN — SERTRALINE HYDROCHLORIDE 25 MG: 25 TABLET ORAL at 08:00

## 2023-01-01 RX ADMIN — Medication 25 MCG: at 09:16

## 2023-01-01 RX ADMIN — OLANZAPINE 5 MG: 5 TABLET, ORALLY DISINTEGRATING ORAL at 08:00

## 2023-01-01 RX ADMIN — PRAZOSIN HYDROCHLORIDE 1 MG: 1 CAPSULE ORAL at 20:59

## 2023-01-01 RX ADMIN — ACETAMINOPHEN 975 MG: 325 TABLET ORAL at 15:35

## 2023-01-01 RX ADMIN — MEMANTINE 10 MG: 10 TABLET ORAL at 19:40

## 2023-01-01 RX ADMIN — LORAZEPAM 1 MG: 2 INJECTION INTRAMUSCULAR; INTRAVENOUS at 15:51

## 2023-01-01 RX ADMIN — CYANOCOBALAMIN TAB 500 MCG 500 MCG: 500 TAB at 09:02

## 2023-01-01 RX ADMIN — POLYETHYLENE GLYCOL 3350 17 G: 17 POWDER, FOR SOLUTION ORAL at 09:33

## 2023-01-01 RX ADMIN — LORAZEPAM 1 MG: 2 INJECTION INTRAMUSCULAR; INTRAVENOUS at 04:54

## 2023-01-01 RX ADMIN — OLANZAPINE 5 MG: 5 TABLET, ORALLY DISINTEGRATING ORAL at 17:34

## 2023-01-01 RX ADMIN — CYANOCOBALAMIN TAB 500 MCG 500 MCG: 500 TAB at 09:18

## 2023-01-01 RX ADMIN — MIRTAZAPINE 15 MG: 15 TABLET, FILM COATED ORAL at 22:36

## 2023-01-01 RX ADMIN — MEMANTINE 10 MG: 10 TABLET ORAL at 09:30

## 2023-01-01 RX ADMIN — LORAZEPAM 1 MG: 2 INJECTION INTRAMUSCULAR; INTRAVENOUS at 19:40

## 2023-01-01 RX ADMIN — MEMANTINE 10 MG: 10 TABLET ORAL at 20:46

## 2023-01-01 RX ADMIN — ACETAMINOPHEN 975 MG: 325 TABLET ORAL at 16:52

## 2023-01-01 RX ADMIN — ASPIRIN 81 MG: 81 TABLET, COATED ORAL at 09:33

## 2023-01-01 RX ADMIN — MEMANTINE 10 MG: 10 TABLET ORAL at 07:58

## 2023-01-01 RX ADMIN — MEMANTINE 10 MG: 10 TABLET ORAL at 09:00

## 2023-01-01 RX ADMIN — QUETIAPINE FUMARATE 12.5 MG: 25 TABLET ORAL at 09:16

## 2023-01-01 RX ADMIN — ACETAMINOPHEN 975 MG: 325 TABLET ORAL at 07:22

## 2023-01-01 RX ADMIN — PANTOPRAZOLE SODIUM 40 MG: 40 TABLET, DELAYED RELEASE ORAL at 06:30

## 2023-01-01 RX ADMIN — MEMANTINE 10 MG: 10 TABLET ORAL at 19:21

## 2023-01-01 RX ADMIN — PANTOPRAZOLE SODIUM 40 MG: 40 TABLET, DELAYED RELEASE ORAL at 09:33

## 2023-01-01 RX ADMIN — OLANZAPINE 5 MG: 5 TABLET, ORALLY DISINTEGRATING ORAL at 02:48

## 2023-01-01 RX ADMIN — IOPAMIDOL 75 ML: 755 INJECTION, SOLUTION INTRAVENOUS at 18:15

## 2023-01-01 RX ADMIN — PRAZOSIN HYDROCHLORIDE 1 MG: 1 CAPSULE ORAL at 22:09

## 2023-01-01 RX ADMIN — OLANZAPINE 2.5 MG: 5 TABLET, ORALLY DISINTEGRATING ORAL at 20:01

## 2023-01-01 RX ADMIN — Medication 25 MCG: at 09:33

## 2023-01-01 RX ADMIN — ACETAMINOPHEN 975 MG: 325 TABLET ORAL at 09:33

## 2023-01-01 RX ADMIN — Medication 3 MG: at 20:27

## 2023-01-01 RX ADMIN — PANTOPRAZOLE SODIUM 40 MG: 40 TABLET, DELAYED RELEASE ORAL at 09:29

## 2023-01-01 RX ADMIN — QUETIAPINE FUMARATE 12.5 MG: 25 TABLET ORAL at 21:09

## 2023-01-01 RX ADMIN — LORAZEPAM 1 MG: 2 INJECTION INTRAMUSCULAR; INTRAVENOUS at 15:15

## 2023-01-01 RX ADMIN — PANTOPRAZOLE SODIUM 40 MG: 40 TABLET, DELAYED RELEASE ORAL at 07:22

## 2023-01-01 RX ADMIN — LORAZEPAM 1 MG: 2 INJECTION INTRAMUSCULAR; INTRAVENOUS at 03:27

## 2023-01-01 RX ADMIN — QUETIAPINE FUMARATE 12.5 MG: 25 TABLET ORAL at 20:27

## 2023-01-01 RX ADMIN — Medication 100 MG: at 09:29

## 2023-01-01 RX ADMIN — HALOPERIDOL LACTATE 2 MG: 5 INJECTION, SOLUTION INTRAMUSCULAR at 12:57

## 2023-01-01 RX ADMIN — SERTRALINE HYDROCHLORIDE 25 MG: 25 TABLET ORAL at 17:34

## 2023-01-01 RX ADMIN — PANTOPRAZOLE SODIUM 40 MG: 40 TABLET, DELAYED RELEASE ORAL at 06:52

## 2023-01-01 RX ADMIN — OLANZAPINE 2.5 MG: 5 TABLET, ORALLY DISINTEGRATING ORAL at 13:14

## 2023-01-01 RX ADMIN — ACETAMINOPHEN 975 MG: 325 TABLET ORAL at 09:16

## 2023-01-01 RX ADMIN — ACETAMINOPHEN 975 MG: 325 TABLET ORAL at 23:00

## 2023-01-01 RX ADMIN — MEMANTINE 10 MG: 10 TABLET ORAL at 07:20

## 2023-01-01 RX ADMIN — MEMANTINE 10 MG: 10 TABLET ORAL at 20:58

## 2023-01-01 RX ADMIN — PANTOPRAZOLE SODIUM 40 MG: 40 TABLET, DELAYED RELEASE ORAL at 08:00

## 2023-01-01 RX ADMIN — CEPHALEXIN 500 MG: 500 CAPSULE ORAL at 09:16

## 2023-01-01 RX ADMIN — CEFTRIAXONE 1 G: 1 INJECTION, POWDER, FOR SOLUTION INTRAMUSCULAR; INTRAVENOUS at 09:34

## 2023-01-01 RX ADMIN — LORAZEPAM 1 MG: 2 INJECTION INTRAMUSCULAR; INTRAVENOUS at 23:57

## 2023-01-01 RX ADMIN — LORAZEPAM 1 MG: 2 INJECTION INTRAMUSCULAR; INTRAVENOUS at 16:19

## 2023-01-01 RX ADMIN — ENOXAPARIN SODIUM 40 MG: 100 INJECTION SUBCUTANEOUS at 11:13

## 2023-01-01 RX ADMIN — Medication 3 MG: at 21:09

## 2023-01-01 RX ADMIN — MEMANTINE 10 MG: 10 TABLET ORAL at 20:01

## 2023-01-01 RX ADMIN — LORAZEPAM 1 MG: 2 INJECTION INTRAMUSCULAR; INTRAVENOUS at 19:54

## 2023-01-01 RX ADMIN — HYDROXYZINE HYDROCHLORIDE 10 MG: 10 TABLET ORAL at 19:39

## 2023-01-01 RX ADMIN — HYDROXYZINE HYDROCHLORIDE 10 MG: 10 TABLET ORAL at 15:35

## 2023-01-01 RX ADMIN — CEFTRIAXONE 1 G: 1 INJECTION, POWDER, FOR SOLUTION INTRAMUSCULAR; INTRAVENOUS at 08:53

## 2023-01-01 RX ADMIN — ACETAMINOPHEN 975 MG: 325 TABLET ORAL at 00:41

## 2023-01-01 RX ADMIN — PANTOPRAZOLE SODIUM 40 MG: 40 TABLET, DELAYED RELEASE ORAL at 09:03

## 2023-01-01 RX ADMIN — QUETIAPINE FUMARATE 12.5 MG: 25 TABLET ORAL at 20:46

## 2023-01-01 RX ADMIN — CYANOCOBALAMIN TAB 500 MCG 500 MCG: 500 TAB at 09:04

## 2023-01-01 RX ADMIN — Medication 100 MG: at 09:03

## 2023-01-01 RX ADMIN — ZIPRASIDONE MESYLATE 10 MG: 20 INJECTION, POWDER, LYOPHILIZED, FOR SOLUTION INTRAMUSCULAR at 18:26

## 2023-01-01 RX ADMIN — Medication 1 MG: at 22:58

## 2023-01-01 ASSESSMENT — ACTIVITIES OF DAILY LIVING (ADL)
ADLS_ACUITY_SCORE: 44
ADLS_ACUITY_SCORE: 37
ADLS_ACUITY_SCORE: 37
ADLS_ACUITY_SCORE: 42
ADLS_ACUITY_SCORE: 40
ADLS_ACUITY_SCORE: 40
ADLS_ACUITY_SCORE: 37
ADLS_ACUITY_SCORE: 36
ADLS_ACUITY_SCORE: 36
ADLS_ACUITY_SCORE: 45
ADLS_ACUITY_SCORE: 40
ADLS_ACUITY_SCORE: 40
ADLS_ACUITY_SCORE: 37
ADLS_ACUITY_SCORE: 42
ADLS_ACUITY_SCORE: 44
ADLS_ACUITY_SCORE: 36
ADLS_ACUITY_SCORE: 44
ADLS_ACUITY_SCORE: 44
ADLS_ACUITY_SCORE: 42
ADLS_ACUITY_SCORE: 37
ADLS_ACUITY_SCORE: 42
ADLS_ACUITY_SCORE: 44
ADLS_ACUITY_SCORE: 40
ADLS_ACUITY_SCORE: 42
ADLS_ACUITY_SCORE: 37
ADLS_ACUITY_SCORE: 36
ADLS_ACUITY_SCORE: 44
ADLS_ACUITY_SCORE: 42
ADLS_ACUITY_SCORE: 35
ADLS_ACUITY_SCORE: 35
ADLS_ACUITY_SCORE: 37
ADLS_ACUITY_SCORE: 37
ADLS_ACUITY_SCORE: 42
ADLS_ACUITY_SCORE: 37
ADLS_ACUITY_SCORE: 37
ADLS_ACUITY_SCORE: 42
ADLS_ACUITY_SCORE: 37
ADLS_ACUITY_SCORE: 44
ADLS_ACUITY_SCORE: 42
ADLS_ACUITY_SCORE: 37
ADLS_ACUITY_SCORE: 35
ADLS_ACUITY_SCORE: 44
ADLS_ACUITY_SCORE: 42
ADLS_ACUITY_SCORE: 44
ADLS_ACUITY_SCORE: 35
ADLS_ACUITY_SCORE: 44
ADLS_ACUITY_SCORE: 42
ADLS_ACUITY_SCORE: 44
ADLS_ACUITY_SCORE: 37
ADLS_ACUITY_SCORE: 44
ADLS_ACUITY_SCORE: 37
ADLS_ACUITY_SCORE: 44
ADLS_ACUITY_SCORE: 45
ADLS_ACUITY_SCORE: 44
ADLS_ACUITY_SCORE: 42
ADLS_ACUITY_SCORE: 42
ADLS_ACUITY_SCORE: 35
ADLS_ACUITY_SCORE: 37
ADLS_ACUITY_SCORE: 44
ADLS_ACUITY_SCORE: 40
ADLS_ACUITY_SCORE: 44
ADLS_ACUITY_SCORE: 45
ADLS_ACUITY_SCORE: 37
ADLS_ACUITY_SCORE: 42
ADLS_ACUITY_SCORE: 37
ADLS_ACUITY_SCORE: 44
ADLS_ACUITY_SCORE: 40
ADLS_ACUITY_SCORE: 37
ADLS_ACUITY_SCORE: 42
ADLS_ACUITY_SCORE: 37
ADLS_ACUITY_SCORE: 44
ADLS_ACUITY_SCORE: 37
ADLS_ACUITY_SCORE: 44
ADLS_ACUITY_SCORE: 37
ADLS_ACUITY_SCORE: 45
ADLS_ACUITY_SCORE: 37
ADLS_ACUITY_SCORE: 36
ADLS_ACUITY_SCORE: 42
ADLS_ACUITY_SCORE: 40
ADLS_ACUITY_SCORE: 42
ADLS_ACUITY_SCORE: 37
ADLS_ACUITY_SCORE: 42
ADLS_ACUITY_SCORE: 44
ADLS_ACUITY_SCORE: 37
ADLS_ACUITY_SCORE: 44
ADLS_ACUITY_SCORE: 37
ADLS_ACUITY_SCORE: 42
ADLS_ACUITY_SCORE: 37
ADLS_ACUITY_SCORE: 44
ADLS_ACUITY_SCORE: 37
ADLS_ACUITY_SCORE: 44
ADLS_ACUITY_SCORE: 40
ADLS_ACUITY_SCORE: 37
ADLS_ACUITY_SCORE: 35
ADLS_ACUITY_SCORE: 40
ADLS_ACUITY_SCORE: 42
ADLS_ACUITY_SCORE: 45
ADLS_ACUITY_SCORE: 33
ADLS_ACUITY_SCORE: 37

## 2023-08-07 PROBLEM — R09.A2 GLOBUS SENSATION: Status: ACTIVE | Noted: 2023-01-01

## 2023-08-07 PROBLEM — R63.4 ABNORMAL WEIGHT LOSS: Status: ACTIVE | Noted: 2019-09-11

## 2023-08-07 PROBLEM — F41.9 ANXIETY: Status: ACTIVE | Noted: 2023-01-01

## 2023-08-07 PROBLEM — N28.89 RENAL MASS: Status: ACTIVE | Noted: 2019-10-11

## 2023-08-07 PROBLEM — F41.0 PANIC ATTACK: Status: ACTIVE | Noted: 2023-01-01

## 2023-08-07 PROBLEM — I10 HTN (HYPERTENSION): Status: ACTIVE | Noted: 2017-02-07

## 2023-08-07 PROBLEM — F03.90 DEMENTIA (H): Status: ACTIVE | Noted: 2022-04-12

## 2023-08-07 PROBLEM — R94.4 DECREASED GFR: Status: ACTIVE | Noted: 2019-07-27

## 2023-08-07 PROBLEM — N28.9 KIDNEY LESION: Status: ACTIVE | Noted: 2019-07-27

## 2023-08-07 PROBLEM — Z85.528 HISTORY OF RENAL CELL CARCINOMA: Status: ACTIVE | Noted: 2020-01-09

## 2023-08-07 NOTE — ED NOTES
PT and spouse compliant with discharge plan and able to repeat plan back. They feel comfortable discharging home. PT declines self harm.

## 2023-08-07 NOTE — ED PROVIDER NOTES
EMERGENCY DEPARTMENT ENCOUNTER      NAME: Aide Brooks  AGE: 57 year old female  YOB: 1965  MRN: [id number]  EVALUATION DATE & TIME: 8/7/2023 12:37 PM    PCP: Ivory Benjamin    ED PROVIDER: Baljeet Wallace M.D.      Chief Complaint   Patient presents with    Depression    Anxiety         IMPRESSION  1. Depression, unspecified depression type    2. Early onset Alzheimer's dementia with mood disturbance, unspecified dementia severity (H)        PLAN  - follow up DEC assessment, not holdable    ED COURSE & MEDICAL DECISION MAKING    ED Course as of 08/07/23 1424   Mon Aug 07, 2023   1241 57yoF with history of Alzheimer's dementia (follows with [facility],  states was diagnosed 5 years ago), HTN presenting with her  for evaluation of depression.  reports 1 week of increased episodes of crying & shaking at home; does take PRN Ativan but doesn't seem to help much. Seems to decrease with time. Does not think she takes any other medications.  called [facility] today for increased crying episodes and was told to come to the Emergency Department. Here now, patient states she feels fine;  states she seems fine now too. Denies any suicidal ideation, homicidal ideation, hallucinations, any physical complaints. Acting baseline per .  hoping to have medication changes made to help with patient 's depression.    SBP 150s with otherwise normal vitals on presentation. Calm & pleasant on exam, denies SI/HI/hallucinations, oriented to person/place but not time (unsure of year), no focal neuro deficits, clear lungs, normal work of breathing, benign abdomen, no peripheral edema. Has pleasant baseline dementia but no suggestion of [place] or psychosis. Does not appear to be a danger to herself or others. Medically cleared at this time. Do suspect changes in psych meds would help the patient;  very much wants this. Will consult DEC to help get this done. Patient &   comfortable with this plan; no further questions at this time.   1418 Patient signed out to Dr. Levi at routine shift change. Plan at this time is follow up DEC assessment; likely discharge.       --------------------------------------------------------------------------------   --------------------------------------------------------------------------------           This patient involved a high degree of complexity in medical decision making, as significant risks were present and assessed. Recent encounters & results in medical record reviewed by me.    All workup (i.e. any EKG/labs/imaging as per charting below) reviewed and independently interpreted by me. See respective sections for details.        See additional MDM below if interested.    MEDICATIONS GIVEN IN THE EMERGENCY DEPARTMENT  Medications - No data to display    NEW PRESCRIPTIONS STARTED AT TODAY'S ER VISIT  Current Discharge Medication List        CONTINUE these medications which have NOT CHANGED    Details   LORazepam (ATIVAN) 0.5 MG tablet Take 0.5-1 Tablets (0.25-0.5 mg) by mouth every 4 hours if needed for Anxiety.      memantine (NAMENDA) 5 MG tablet Take 2 tablets by mouth 2 times daily      mirtazapine (REMERON) 15 MG tablet Take 15 mg by mouth At Bedtime      albuterol (PROAIR HFA;PROVENTIL HFA;VENTOLIN HFA) 90 mcg/actuation inhaler [ALBUTEROL (PROAIR HFA;PROVENTIL HFA;VENTOLIN HFA) 90 MCG/ACTUATION INHALER] Inhale 2 puffs every 4 (four) hours as needed for wheezing.  Qty: 1 Inhaler, Refills: 0    Comments: May substitute the equivalent medication per insurance preference.  Associated Diagnoses: Bronchitis      aluminum-magnesium hydroxide 200-200 mg/5 mL suspension [ALUMINUM-MAGNESIUM HYDROXIDE 200-200 MG/5 ML SUSPENSION] Take 5 mL by mouth every 6 (six) hours as needed for indigestion.  Qty: 100 mL, Refills: 0    Associated Diagnoses: Gastroesophageal reflux disease without esophagitis      buPROPion (WELLBUTRIN SR) 150 MG 12 hr  tablet Take 150 mg by mouth                 =================================================================      HPI  Aide Brooks is a 57 year old female with history of Alzheimer's dementia (follows with [facility],  states was diagnosed 5 years ago), HTN presenting with her  for evaluation of depression.  reports 1 week of increased episodes of crying & shaking at home; does take PRN Ativan but doesn't seem to help much. Seems to decrease with time. Does not think she takes any other medications.  called [facility] today for increased crying episodes and was told to come to the Emergency Department. Here now, patient states she feels fine;  states she seems fine now too. Denies any suicidal ideation, homicidal ideation, hallucinations, any physical complaints. Acting baseline per .  hoping to have medication changes made to help with patient 's depression.          --------------- MEDICAL HISTORY ---------------  PAST MEDICAL HISTORY:  Reviewed by me.  Past Medical History:   Diagnosis Date    Cervical cancer (H)     Hypertension      Patient Active Problem List   Diagnosis    Abnormal weight loss    Decreased GFR    Dementia (H)    Globus sensation    History of cervical cancer    History of renal cell carcinoma    HTN (hypertension)    Kidney lesion    Renal mass       PAST SURGICAL HISTORY:  Reviewed by me.  Past Surgical History:   Procedure Laterality Date    HYSTERECTOMY         CURRENT MEDICATIONS:    Reviewed by me.  No current facility-administered medications for this encounter.    Current Outpatient Medications:     LORazepam (ATIVAN) 0.5 MG tablet, Take 0.5-1 Tablets (0.25-0.5 mg) by mouth every 4 hours if needed for Anxiety., Disp: , Rfl:     memantine (NAMENDA) 5 MG tablet, Take 2 tablets by mouth 2 times daily, Disp: , Rfl:     mirtazapine (REMERON) 15 MG tablet, Take 15 mg by mouth At Bedtime, Disp: , Rfl:     albuterol (PROAIR  "HFA;PROVENTIL HFA;VENTOLIN HFA) 90 mcg/actuation inhaler, [ALBUTEROL (PROAIR HFA;PROVENTIL HFA;VENTOLIN HFA) 90 MCG/ACTUATION INHALER] Inhale 2 puffs every 4 (four) hours as needed for wheezing., Disp: 1 Inhaler, Rfl: 0    aluminum-magnesium hydroxide 200-200 mg/5 mL suspension, [ALUMINUM-MAGNESIUM HYDROXIDE 200-200 MG/5 ML SUSPENSION] Take 5 mL by mouth every 6 (six) hours as needed for indigestion., Disp: 100 mL, Rfl: 0    buPROPion (WELLBUTRIN SR) 150 MG 12 hr tablet, Take 150 mg by mouth, Disp: , Rfl:     ALLERGIES:  Reviewed by me.  No Known Allergies    FAMILY HISTORY:  Reviewed by me.  Reviewed. No pertinent past family history.    SOCIAL HISTORY:   Reviewed by me.  Social History     Socioeconomic History    Marital status:    Tobacco Use    Smoking status: Every Day         --------------- PHYSICAL EXAM ---------------  Nursing notes and vitals independently reviewed by me.  VITALS:  Vitals:    08/07/23 1231   BP: (!) 149/78   Pulse: 88   Resp: 16   Temp: 97.7  F (36.5  C)   TempSrc: Temporal   SpO2: 96%   Weight: 90.7 kg (200 lb)   Height: 1.676 m (5' 6\")       PHYSICAL EXAM:    General:  alert, interactive, no distress  Eyes:  conjunctivae clear, conjugate gaze  HENT:  atraumatic, nose with no rhinorrhea, oropharynx clear  Neck:  no meningismus  Cardiovascular:  HR 80s during exam, regular rhythm, no murmurs, brisk cap refill  Chest:  no chest wall tenderness  Pulmonary:  no stridor, normal phonation, normal work of breathing, clear lungs bilaterally  Abdomen:  soft, nondistended, nontender  :  no CVA tenderness  Back:  no midline spinal tenderness  Musculoskeletal:  no pretibial edema, no calf tenderness. Gross ROM intact to joints of extremities with no obvious deformities.  Skin:  warm, dry, no rash  Neuro:  awake, alert, answers simple questions directly, oriented to person/place but unsure of year (baseline dementia per ), CN 2-12 intact, negative pronator drift, 5/5 strength to all " extremities with sensation to light touch intact, no tremor  Psych:  calm, normal affect, denies SI/HI/hallucinations, clear direct non-tangential speech                --------------- ADDITIONAL MDM ---------------  History:  - I considered systemic symptoms of the presenting illness.  - Supplemental history from:       -- see above charting, if applicable: patient, family ()  - External Record(s) reviewed:       -- see above charting, if applicable       -- Inpatient/outpatient record (clinic visit 7/3/23), prior labs (blood 5/11/22), prior imaging (CXR 1/9/20)    Workup:  - Chart documentation above includes differential considered and any EKGs or imaging independently interpreted by provider.  - In additional to work up documented, I considered the following work up:       -- see above charting, if applicable    External Consultation:  - Discussion of management with another provider:       -- see above charting, if applicable    Complicating Factors:  - Care impacted by chronic illness:       -- see above MDM, past medical history, & problem list  - Care affected by social determinants of health:       -- see above social history       -- Access to Affordable Healthcare    Disposition Considerations:  - Admission considered. Patient was signed out to the oncoming physician, disposition pending.         Baljeet Wallace MD  08/07/23  Emergency Medicine  [facility] EMERGENCY ROOM  [address] [place] [id number] [phone]  Dept: [phone]     Baljeet Wallace MD  08/07/23 1424

## 2023-08-07 NOTE — DISCHARGE INSTRUCTIONS
Aftercare Plan      Recommendations:  Coordinators from [facility] ([facility]) will be calling you in the next 24-48 hours to ensure that you have the resources you need. For additonal need of mental health services, you can also contact [facility] coordinators directly at [phone].    1) Follow up with scheduled appointment for psychiatry.  2) Continue to follow up with the [facility].    Scheduled Appointments:    Date: Tuesday, 8/8/2023  Time: 1:00 pm - 2:00 pm  Provider: Miri Cason DNP, CNP,RN  Location: [facility], [address] [place] [address]  Phone: [phone]  Type: Medication Mgmt - Initial (In-Person)    Patient Instructions  Greetings! Upon receipt of a referral from [facility], our scheduling department will call and text you to confirm the appointment. Once the appointment is confirmed, we will send you intake forms to your email of which need to be completed upon receipt to keep the scheduled appointment. If you do not have an email, we will encourage you to schedule your appointment with us in person. We look forward to working with you! [email or web address]      If I am feeling unsafe or I am in a crisis, I will:  Continue reaching out for help when needed.  - Contact my established care providers   - Call the National Suicide Prevention Lifeline: 766.483.8769   - [place] CRISIS TEXT Line 551406  - Go to the nearest emergency room   - Call 911 or 988 or 211    Warning signs that I or other people might notice when a crisis is developing for me:    - persistent worsening of agitation and panic attacks  - feeling sudden increase in frustration, anger, or irritation  - persistent worsening of depression and anxiety  - intrusive thoughts of suicide or self harm    Things that help me to feel better:  - engaging in personal hobby or interests  - frequent self care activities  - spending quality time with friends or  family  - listen to soothing music     People and social settings that provide distraction:  - spending quality time with trusted friend  - asking for help as needed   Going for dinner, playing cards or board games, coffee shops, biking.    People whom I can ask for help:  - trusted family member, coworker, or friend  - Crisis   - outpatient provider    [facility] has a mental health crisis team you can call 24/7: [facility] Crisis  [phone]    Crisis Lines  Crisis Text Line  Text 077651  You will be connected with a trained live crisis counselor to provide support.    Por espanol, texto  ROLAND a 533988 o texto a 442-AYUDAME en WhatsApp    National Hope Line  1.800.SUICIDE [7766467]    Additional Information  Today you were seen by a licensed mental health professional through Triage and Transition services, [facility] ([facility])  for a crisis assessment in the Emergency Department at [facility].  It is recommended that you follow up with your established providers (psychiatrist, mental health therapist, and/or primary care doctor - as relevant) as soon as possible.     Coordinators from [facility] will be calling you in the next 24-48 hours to ensure that you have the resources you need.  You can also contact [facility] coordinators directly at [phone]. You may have been scheduled for or offered an appointment with a mental health provider. [facility] maintains an extensive network of licensed behavioral health providers to connect patients with the services they need.  We do not charge providers a fee to participate in our referral network.  We match patients with providers based on a patient's specific needs, insurance coverage, and location.  Our first effort will be to refer you to a provider within your care system, and will utilize providers outside your care system as needed.

## 2023-08-07 NOTE — ED NOTES
AIDET performed, white board updated for rounding. Patient updated on plan of care. Patient's pain assessed. Call light within reach, bed in low position, side rails up. Visitor at bedside: spouse

## 2023-08-07 NOTE — CONSULTS
Diagnostic Evaluation Consultation  Crisis Assessment    Patient Name: Aide Brooks  Age:  57 year old  Legal Sex: female  Gender Identity: female  Pronouns:   Race: White  Ethnicity: Not  or   Language: English      Patient was assessed: Virtual: [employer]      Patient location: [facility] EMERGENCY ROOM                                 Referral Data and Chief Complaint  Aide Brooks presents to the ED with family/friends (Patient arrived to the ED accompanied by her .). Patient is presenting to the ED for the following concerns: Anxiety (Patient with history for dementia/alzheimers disease is experiencing recently increased anxiety, panic attacks, and agitation. Her  called [facility] today and it was recommended they be seen at the ER for immediate relief.).   Factors that make the mental health crisis life threatening or complex are:  Patient denies suicidal or homicidal ideation. Thought processes are organized. She resides with  in safe and supportive environment..      Informed Consent and Assessment Methods  Explained the crisis assessment process, including applicable information disclosures and limits to confidentiality, assessed understanding of the process, and obtained consent to proceed with the assessment.  Assessment methods included conducting a formal interview with patient, review of medical records, collaboration with medical staff, and obtaining relevant collateral information from family and community providers when available.  : done     Patient response to interventions: acceptance expressed, verbalizes understanding  Coping skills were attempted to reduce the crisis:  Patient utilizes medication management through [facility].     History of the Crisis   Patient and her  were present in the ER and both interviewed by DEC. The patient's  assisted by provided the majority of information  for this assessment. He reports his wife has been having increased anxiety and panic attacks over the past week. She was diagnosed with mild cognitive  impairment and dementia about 5 years. She has since quit her job and has progressed to alzheimers disease. She reports no previous psychiatric hospitaliztions, suicide attempts, or self injurious behaviors. She has PCP with [facility] and Neurology with [facility]. She is prescribed Namenda, Remeron, and Lorazapam. In the past week she has had worsening symptoms of anxiety, panic attacks, and agitation. She has been more tearful and rocks back and forth alot.  does not know what to do.    Brief Psychosocial History  Family:  , Children yes  Support System:  , Children, Significant Other, Sibling(s)  Employment Status:  disabled  Source of Income:  disability  Financial Environmental Concerns:  No concerns identified  Current Hobbies:  family functions, interaction with pets, music, television/movies/videos  Barriers in Personal Life:   (none identified)    Significant Clinical History  Current Anxiety Symptoms:  panic attack, anxious, racing thoughts (Symptoms of anxiety, panic attacks, and agitation has increased within the past week.)  Current Depression/Trauma:  sense of doom, helplessness  Current Somatic Symptoms:  excessive worry, racing thoughts, anxious  Current Psychosis/Thought Disturbance:  agitation  Current Eating Symptoms:   (denies)  Chemical Use History:  Alcohol: None (denies)  Benzodiazepines: None (denies)  Opiates: None (denies)  Cocaine: None (denies)  Marijuana: None (denies)  Other Use: None (denies)  Withdrawal Symptoms:  (denies)   Past diagnosis:  Anxiety Disorder (dementia/alzheimers disease)  Family history:  Anxiety Disorder  Past treatment:  Psychiatric Medication Management  Details of most recent treatment:  Patient is being followed by PCP and neurologist with [facility].  Other relevant history:          Collateral  Information  Is there collateral information: Yes     Collateral information name, relationship, phone number:  POLLY LO (Spouse) [phone]    What happened today: Today they were planing to go see their grandchildren and patient was experiencing worsening of panic attacks.  called [facility], it was recommended for them to be seen at the ER for immediate relief.     What is different about patient's functioning: In the past week she has had worsening symptoms of anxiety, panic attacks, and agitation. She has been more tearful and rocks back and forth alot.  does not know what to do.     Concern about alcohol/drug use: no    What do you think the patient needs:      Has patient made comments about wanting to kill themselves/others: no    If d/c is recommended, can they take part in safety/aftercare planning:  yes    Additional collateral information:        Risk Assessment  [place] Suicide Severity Rating Scale Full Clinical Version:  8/7/2023  Suicidal Ideation  Q1 Wish to be Dead (Lifetime): No  Q2 Non-Specific Active Suicidal Thoughts (Lifetime): No     Suicidal Behavior (Lifetime)  Actual Attempt (Lifetime): No  Has subject engaged in non-suicidal self-injurious behavior? (Lifetime): No  Interrupted Attempts (Lifetime): No  Aborted or Self-Interrupted Attempt (Lifetime): No  Preparatory Acts or Behavior (Lifetime): No    [place] Suicide Severity Rating Scale Recent:  Negligible Risk     Environmental or Psychosocial Events: helplessness/hopelessness, worsening chronic illness  Protective Factors: Protective Factors: strong bond to family unit, community support, or employment, intact marriage or domestic partnership, responsibilities and duties to others, including pets and children, lives in a responsibly safe and stable environment, sense of importance of health and wellness, cultural, spiritual , or Roman Catholic beliefs associated with meaning and value in  life    Does the patient have thoughts of harming others? Feels Like Hurting Others: no  Previous Attempt to Hurt Others: no  Current presentation:  (Patient is alert, oriented x3, calm, and cooperative. Affect and eye contact are appropriate.)  Is the patient engaging in sexually inappropriate behavior?: no    Is the patient engaging in sexually inappropriate behavior?  no        Mental Status Exam   Affect: Blunted  Appearance: Appropriate  Attention Span/Concentration: Attentive  Eye Contact: Engaged    Fund of Knowledge: Appropriate   Language /Speech Content: Fluent  Language /Speech Volume: Normal  Language /Speech Rate/Productions: Minimally Responsive  Recent Memory: Intact  Remote Memory: Intact  Mood: Anxious  Orientation to Person: Yes   Orientation to Place: Yes  Orientation to Time of Day: Yes  Orientation to Date: Yes     Situation (Do they understand why they are here?): Yes  Psychomotor Behavior: Normal  Thought Content: Clear  Thought Form: Intact     Mini-Cog Assessment  Number of Words Recalled:    Clock-Drawing Test: 0 Abnormal   Three Item Recall: 1 object recalled  Mini-Cog Total Score: 1     Medication  Psychotropic medications:   Namenda, Lorazapam, Remeron  Medication Orders - Psychiatric (From admission, onward)      None             Current Care Team  Patient Care Team:  Ivory Benjamin MD as PCP - General (Family Medicine)  Miri Cason DNP, CNP as Psychiatrist (Psychiatry)    Diagnosis  Patient Active Problem List   Diagnosis Code    Abnormal weight loss R63.4    Decreased GFR R94.4    Dementia (H) F03.90    Globus sensation R09.89    History of cervical cancer Z85.41    History of renal cell carcinoma Z85.528    HTN (hypertension) I10    Kidney lesion N28.9    Renal mass N28.89    Anxiety F41.9    Panic attack F41.0       Primary Problem This Admission  Active Hospital Problems    *Anxiety      Panic attack      Clinical Summary and Substantiation of Recommendations   Patient has been  experiencing increased anxiety, panic attacks, crying, and agitation over the past week. She is being followed by [facility] for Neurology. After therapeutic assessment, intervention and aftercare planning by ED care team and LM and in consultation with attending provider, the patient's circumstances and mental state were appropriate for outpatient management. It is the recommendation of this clinician that pt discharge with OP MH support. A this time the pt is not presenting as an acute risk to self or others due to the following factors: She denies having current suicidal or homicidal ideation, able to identify supports, skills, and engage in safety planning.    Patient coping skills attempted to reduce the crisis:  Patient utilizes medication management through [facility].    Disposition  Recommended disposition: Medication Management        Reviewed case and recommendations with attending provider. Attending Name: Alvino Levi MD       Attending concurs with disposition: yes       Patient and/or validated legal guardian concurs with disposition:   yes       Final disposition:  discharge    Legal status on admission: Voluntary/Patient has signed consent for treatment    Assessment Details   Total duration spent on the patient case in minutes: 45 min     CPT code(s) utilized: 02314 - Psychotherapy for Crisis - 60 (30-74*) min    Bubba Yanes [facility], Psychotherapist  DEC - Triage & Transition Services  Callback: [phone]

## 2023-08-07 NOTE — ED NOTES
[facility] ED Mental Health Handoff Note:     Assuming care from: Dr Maxx Wallace    Brief HPI: 57 year old female signed out to me by the above provider. See initial ED Provider note for full details of the presentation.   In brief, patient presents for evaluation of depression.  reports 1 week of increased episodes of crying & shaking at home; does take PRN Ativan but doesn't seem to help much. Seems to decrease with time. Does not think she takes any other medications.  called [facility] today for increased crying episodes and was told to come to the Emergency Department. Here now, patient states she feels fine;  states she seems fine now too. Denies any suicidal ideation, homicidal ideation, hallucinations, any physical complaints. Acting baseline per .  hoping to have medication changes made to help with patient 's depression.        Home meds reviewed and ordered/administered: Yes  Medically stable for inpatient mental health admission: Yes.  Evaluated by mental health: Yes. The recommendation is for inpatient mental health treatment. Bed search in process  Safety concerns: At the time I received sign out, there were no safety concerns.    Hold Status:  Active Orders   N/A       Labs/Imaging:   No results found for this visit on 08/07/23 (from the past 24 hour(s)).      ED Meds:  Medications - No data to display  No orders to display       ED Course:  ED Course as of 08/07/23 1639   Mon Aug 07, 2023   1241 57yoF with history of Alzheimer's dementia ([facility]s with [facility],  states was diagnosed 5 years ago), HTN presenting with her  for evaluation of depression.  reports 1 week of increased episodes of crying & shaking at home; does take PRN Ativan but doesn't seem to help much. Seems to decrease with time. Does not think she takes any other medications.  called [facility] today for increased crying episodes and was told to come to  the Emergency Department. Here now, patient states she feels fine;  states she seems fine now too. Denies any suicidal ideation, homicidal ideation, hallucinations, any physical complaints. Acting baseline per .  hoping to have medication changes made to help with patient 's depression.    SBP 150s with otherwise normal vitals on presentation. Calm & pleasant on exam, denies SI/HI/hallucinations, oriented to person/place but not time (unsure of year), no focal neuro deficits, clear lungs, normal work of breathing, benign abdomen, no peripheral edema. Has pleasant baseline dementia but no suggestion of [place] or psychosis. Does not appear to be a danger to herself or others. Medically cleared at this time. Do suspect changes in psych meds would help the patient;  very much wants this. Will consult DEC to help get this done. Patient &  comfortable with this plan; no further questions at this time.   1418 Patient signed out to Dr. Levi at routine shift change. Plan at this time is follow up DEC assessment; likely discharge.   1638 DEC  met with patient and set patient up to see a psychiatry nurse practitioner for medication changes tomorrow at 1 PM.  Patient has a safe place to go.  Should he has lorazepam as needed for anxiety.   1638 Patient and patient's  comfortable with plan for discharge         Impression:    ICD-10-CM    1. Depression, unspecified depression type  F32.A       2. Early onset Alzheimer's dementia with mood disturbance, unspecified dementia severity (H)  G30.0     F02.83           Plan:    Awaiting mental health evaluation/recommendations.    Alivno Levi M.D.  [facility] EMERGENCY ROOM  [address] [id number] [phone]                     Alvino Levi MD  08/07/23 0148

## 2023-08-07 NOTE — ED TRIAGE NOTES
"The patient presents to the ED with  with concerns for increased sadness and panic attacks since over the weekend. Patient has a history of dementia and sees neurologist at [facility]. Patient has been rocking and  \"doesn't know what else to do\". She has lorazepam that she takes as needed.     Triage Assessment       Row Name 08/07/23 1239       Triage Assessment (Adult)    Airway WDL WDL       Respiratory WDL    Respiratory WDL WDL       Skin Circulation/Temperature WDL    Skin Circulation/Temperature WDL WDL       Cardiac WDL    Cardiac WDL WDL       Peripheral/Neurovascular WDL    Peripheral Neurovascular WDL WDL       Cognitive/Neuro/Behavioral WDL    Cognitive/Neuro/Behavioral WDL X                    "

## 2023-10-20 PROBLEM — E03.9 HYPOTHYROIDISM, UNSPECIFIED TYPE: Status: ACTIVE | Noted: 2023-01-01

## 2023-10-20 PROBLEM — Z65.8 DOMESTIC CONCERNS: Status: ACTIVE | Noted: 2023-01-01

## 2023-10-20 PROBLEM — F03.90 MAJOR NEUROCOGNITIVE DISORDER (H): Status: ACTIVE | Noted: 2023-01-01

## 2023-10-20 PROBLEM — Z86.59 HISTORY OF DEMENTIA: Status: ACTIVE | Noted: 2023-01-01
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
## 2023-10-20 NOTE — PLAN OF CARE
Aide PETE Todd  October 20, 2023  Plan of Care Hand-off Note     Patient Care Path: medical    Plan for Care:   DEC  met with patient for crisis assessment to determine disposition and appropriate level of care. Patient presents with a history for Dementia/Alzheimer's Disease and demonstrates increasing level of agitation, aggression/violence, delusional thinking, confusion, and significant memory issues. These symptoms are not new and seem to be a natural progression of Alzheimer's Disease, therefore there is no benefit to a geriatric psych placement. Patient will benefit from assisted living/memory care placement as she is unable to safely care for herself in the community, and family members are no longer able to safely care for her. Patient is recommended by DEC for assisted living/memory care.    Identified Goals and Safety Issues: Monitor and observe for safety. Patient will need assistance with finding assisted living/memory care placement.    Overview:  POLLY LO (Spouse) -- -- [phone]   Martinez Lo (Son) -- -- [phone]    Legal Status: Legal Status at Admission: Voluntary/Patient has signed consent for treatment    Psychiatry Consult: not at this time    PARISH ZayasSW

## 2023-10-20 NOTE — ED NOTES
Pt resting on cart in room. Pt calm and content after speaking with son on the phone. Pt denies any needs currently. Will continue to check on the pt.
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
## 2023-10-20 NOTE — ED NOTES
Writer spoke with pt's son, Martinez. Martinez updated with current plan of care. Phone set up in room, pt currently talking with son Martinez.
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
## 2023-10-20 NOTE — PHARMACY-ADMISSION MEDICATION HISTORY
Pharmacy Intern Admission Medication History    Admission medication history is complete. The information provided in this note is only as accurate as the sources available at the time of the update.    Information Source(s): Family member and CareEverywhere/SureScripts via phone    Pertinent Information: Patient's  manages Aide's medication.     Changes made to PTA medication list:  Added: aspirin, omeprazole, quetiapine, multivitamin, Vit D3, Vit B 12,   Deleted: albuterol, aluminium magnesium hydroxide, bupropion, lorazepam    Changed: None    Medication Affordability:  Not including over the counter (OTC) medications, was there a time in the past 3 months when you did not take your medications as prescribed because of cost?: No    Allergies reviewed with patient and updates made in EHR: yes    Medication History Completed By: Milly Corbett 10/20/2023 1:29 PM    PTA Med List   Medication Sig Last Dose    aspirin 81 MG EC tablet Take 81 mg by mouth daily 10/19/2023 at am    cholecalciferol (VITAMIN D3) 25 mcg (1000 units) capsule Take 1 capsule by mouth daily 10/19/2023 at am    cyanocobalamin (VITAMIN B-12) 500 MCG tablet Take 500 mcg by mouth daily 10/19/2023 at am    memantine (NAMENDA) 10 MG tablet Take 10 mg by mouth 2 times daily 10/19/2023 at pm    mirtazapine (REMERON) 15 MG tablet Take 15 mg by mouth At Bedtime 10/19/2023 at pm    Multiple Vitamins-Minerals (MULTIVITAL PO) Take 1 tablet by mouth daily 10/19/2023 at am    omeprazole (PRILOSEC) 20 MG DR capsule Take 20 mg by mouth daily as needed 10/19/2023 at 1300    QUEtiapine (SEROQUEL) 25 MG tablet Take 0.5 tab in the morning and 1 tablet in the evening 10/19/2023 at pm

## 2023-10-20 NOTE — ED NOTES
Pt coming out of room. Writer reassured pt that she is safe here. Pt brought back into room. Pt has been medicated. Care channel on, with music playing. Warm blanket provided.
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
## 2023-10-20 NOTE — PROGRESS NOTES
Care Management Follow Up    Additional Information:  CM Sup spoke with JN BROWN. JN BROWN visited with the Pt and spoke with Son Martinez. CM to reassess after pt is seen by  Psych provider.       12:47 PM  CM spoke with the pt's son Martinez. Martinez agrees that placement is needed. Martinez states understandign that placement may need to be found out side of the hospital if the pt is medically cleared for discharge. CM also spoke about private pay options for home care to help assist. Martinez is currently 4 hours away but usually lives in [place]. Martinez states that he has a sister that has 2 young children and he has a brother that lives in [place]. Martinez states that it is just the pt and the spouse at home. Pt's Spouse, Martinez's dad Jose, is the pt's care giver.     CM spoke with DEC  who states that pt needs placement. DEC  feels that the pt is unable to make her own decisions and would defer to spouse.  Psych provider assessment still pending.     1:40 PM  CM called and spoke with spouse. Spouse appeared very overwhlemed on the phone. Spouse has been calling for memory care or a temporary placement until a long term plan is made. Spouse states that he wants the pt to be safe. He and the pt are on a fixed income and would be interested with speaking with the Financial Consular team after learning about possible options for care and the possible private pay needs vs MA and other plans. Referral to Financial Consular made. Spouse stated that he has reached out to [facility] for possible placement. Spouse is open to sending referrals as needed. Spouse stated that he had already spoken to another staff member about TCU, CM clarified any miscommunication that was told to the spouse. Spouse states understanding that the Pt is currently under Obs status and how this may affect billing. Spouse is open to the CM sending referrals for TCU/LTC/prison/MC. CM also talked about possible private duty home care at  home as well. Referrals made to [facility] as primary location and spouse is okay with placement if not found in preferred area.       GIANNA Rios
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
## 2023-10-20 NOTE — PROGRESS NOTES
DEC  met with patient for crisis assessment to determine disposition and appropriate level of care. Patient presents with a history for Dementia/Alzheimer's Disease and demonstrates increasing level of agitation, aggression/violence, delusional thinking, confusion, and significant memory issues. These symptoms are not new and seem to be a natural progression of Alzheimer's Disease, therefore there is no benefit to a geriatric psych placement. Patient will benefit from assisted living memory care placement as she is unable to safely care for herself in the community and family members are unable to safely care for her.

## 2023-10-21 NOTE — PLAN OF CARE
Pt disoriented x4. VSS. Call light within reach and alarms on. Pt has no code status, paged hospital doc for code status, hospital doc said to paged cross cover, RN called  for code status,  did not wish to address this concern right now and wished to do it in the morning,RN updated cross-cover. Pt SBA. Pt swallows pills whole with water.    Problem: Adult Inpatient Plan of Care  Goal: Optimal Comfort and Wellbeing  Outcome: Not Progressing   Goal Outcome Evaluation:

## 2023-10-21 NOTE — PLAN OF CARE
"  Problem: Adult Inpatient Plan of Care  Goal: Plan of Care Review  Description: The Plan of Care Review/Shift note should be completed every shift.  The Outcome Evaluation is a brief statement about your assessment that the patient is improving, declining, or no change.  This information will be displayed automatically on your shift  note.  Outcome: Not Progressing     Problem: Adult Inpatient Plan of Care  Goal: Patient-Specific Goal (Individualized)  Description: You can add care plan individualizations to a care plan. Examples of Individualization might be:  \"Parent requests to be called daily at 9am for status\", \"I have a hard time hearing out of my right ear\", or \"Do not touch me to wake me up as it startles  me\".  Outcome: Not Progressing   Goal Outcome Evaluation:       VSS on RA. 1:1 initiated due to suicidal ideations. Pt told writer that she wants someone to just kill her. Anxiety heightened, page to MD out. Reg diet tolerating well. Alert to self. ABX continued. Discharge pending                  "

## 2023-10-21 NOTE — PROVIDER NOTIFICATION
"Paged cross-cover \"Pt does not have a code status, called in house hospital doc and they said cross-cover can address this. They said cross cover can call family and talk with them. Pt is disorientedx4, with a history of dementia\"  Talked with cross-cover and RN called .  is unsure of pt wished for code status and wishes to discusses this in the morning. Rn updated remote cross-cover  "

## 2023-10-21 NOTE — PLAN OF CARE
PRIMARY DIAGNOSIS: MAJOR NEUROCOGNITIVE DOSORDER  OUTPATIENT/OBSERVATION GOALS TO BE MET BEFORE DISCHARGE:  ADLs back to baseline: Yes    Activity and level of assistance: Up with standby assistance.    Pain status: Pain free.    Return to near baseline physical activity: Yes     Discharge Planner Nurse   Safe discharge environment identified: No  Barriers to discharge: Yes       Entered by: Tamica Sanchez RN 10/21/2023 4:48 AM    Patient Continues to be alert and orientated to self only. Denies pain. Up to the restroom at this time with SBA. Present confusion on what she should be doing; however, is pleasant and easily redirectable. Currently in bed watching TV. Bed alarm is in place for patient safety. Patient has set off alarm times two so far tonight. Has not used call light.

## 2023-10-21 NOTE — PROVIDER NOTIFICATION
Pt does not have a code status, called in house hospital doc and they said cross-cover can address this. They said cross cover can call family and talk with them. Pt is disorientedx4, with a history of dementia.

## 2023-10-21 NOTE — PLAN OF CARE
PRIMARY DIAGNOSIS: MAJOR NEUROCOGNITIVE DISORDER  OUTPATIENT/OBSERVATION GOALS TO BE MET BEFORE DISCHARGE:  ADLs back to baseline: Yes    Activity and level of assistance: Up with standby assistance.    Pain status: Pain free.    Return to near baseline physical activity: Yes     Discharge Planner Nurse   Safe discharge environment identified: No  Barriers to discharge: Yes       Entered by: Tamica Sanchez RN 10/21/2023 2:14 AM     Patient is alert and orientated to self only. She is slow to respond to questions but will answer if given time. She has been pleasant and cooperative. 0 behaviors. She denies pain at this time. IV saline locked. Patient educated and shown demonstration on how to use call light for assistance. Bed alarm in place for patient's safety due to cognitive impairment.
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
## 2023-10-21 NOTE — PROGRESS NOTES
"PRIMARY DIAGNOSIS: \"GENERIC\" NURSING  OUTPATIENT/OBSERVATION GOALS TO BE MET BEFORE DISCHARGE:  ADLs back to baseline: No    Activity and level of assistance: Up with standby assistance.    Pain status: Improved-controlled with oral pain medications.    Return to near baseline physical activity: No     Discharge Planner Nurse   Safe discharge environment identified: Yes  Barriers to discharge: Yes       Entered by: Kaity Christy RN 10/21/2023 10:31 AM     Please review provider order for any additional goals.   Nurse to notify provider when observation goals have been met and patient is ready for discharge.  "

## 2023-10-22 NOTE — PROGRESS NOTES
"PRIMARY DIAGNOSIS: \"GENERIC\" NURSING  OUTPATIENT/OBSERVATION GOALS TO BE MET BEFORE DISCHARGE:  ADLs back to baseline: Yes    Activity and level of assistance: Ambulating independently.    Pain status: Pain free.    Return to near baseline physical activity: Yes     Discharge Planner Nurse   Safe discharge environment identified: No  Barriers to discharge: Yes, Psychiatry consult       Entered by: Kerry Chanel RN 10/21/2023 10:46 PM     Please review provider order for any additional goals.   Nurse to notify provider when observation goals have been met and patient is ready for discharge.    Pt Alert to self only. Pt had increased anxiety and agitation. Scheduled Seroquel and PRN Atarax 10 mg given. VSS expect for hypertension gave anxiety medications and rechecked BP, and it was within normal limits. Refused Tylenol.Call light within reach. Pt denies pain and denies N/V. 1:1 for safety and suicidal ideations. New IV placed.   "

## 2023-10-22 NOTE — UTILIZATION REVIEW
Concurrent stay review; [facility]        Under the authority of the Utilization Management Committee, the utilization review process indicated a secondary review on the above patient.  The review outcome is based on review of the medical records, discussions with staff, and applying clinical experience noted on the date of the review.        (x) Observation/outpatient Status Appropriate - Concurrent stay review       RATIONALE FOR DETERMINATION:   57-year-old female with early onset dementia admitted with acute cystitis.  Urine culture shows greater than 100,000 E. coli with sensitivities to all tested antibiotics including all oral options.  Remains on Rocephin.  Awaiting clinical improvement and placement.  She had increased agitation and aggression and delusional thinking at home, felt to be secondary to advancing dementia and is not a candidate for [facility] psych placement and assisted living/memory care placement has been recommended.    Patient delayed discharge is related to disposition, there is no medical necessity for inpatient admission at the time of this review. If there is a change in patient status, please resend for review.    The information on this document is developed by the utilization review team in order for the business office to ensure compliance.  This only denotes the appropriateness of proper admission status and does not reflect the quality of care rendered.       The definitions of Inpatient Status and Observation Status used in making the determination above are those provided in the CMS Coverage Manual, Chapter 1 and Chapter 6, section 70.4.       Sincerely,    Donald Mcgill MD

## 2023-10-22 NOTE — PROVIDER NOTIFICATION
Provider notified that patient is having increased anxiety/agitation.     Update: Received one time dose of Seroquel 12.5mg

## 2023-10-22 NOTE — PROGRESS NOTES
"PRIMARY DIAGNOSIS: \"GENERIC\" NURSING  OUTPATIENT/OBSERVATION GOALS TO BE MET BEFORE DISCHARGE:  ADLs back to baseline: Yes    Activity and level of assistance: Ambulating independently.    Pain status: Pain free.    Return to near baseline physical activity: Yes     Discharge Planner Nurse   Safe discharge environment identified: No  Barriers to discharge: Yes, Psychiatry consult       Entered by: Kerry Chanel RN 10/21/2023 10:44 PM     Please review provider order for any additional goals.   Nurse to notify provider when observation goals have been met and patient is ready for discharge.    Pt Alert to self only. VSS. Call light within reach. Pt denies pain and denies N/V. 1:1 for safety and suicidal ideations.   "

## 2023-10-22 NOTE — PROGRESS NOTES
Provider called to check on how patient is doing. Informed patient has been awake all shift; however, has been calm and cooperative since receiving one time dose of Seroquel.  Voiced concern regarding patient's increased risk of Delirium due to not sleeping. No new orders, provider stated will update day team. Will also update oncoming nurse of this as well.

## 2023-10-22 NOTE — PROGRESS NOTES
Chart reviewed. [facility] referrals pending. Charge nurse update; psych consult pending. Patient on 1:1.    1200 Nursing rrequested that RNCM call pt's spouse Jose. Updated pt's spouse of pending [facility] referrals. Spouse requested referral to Fncl Counselor, left message for Christina lAex in teams, requesting that she follow up with spouse on Monday 10/23. Left resources for spouse in pt's room (private duty home care agencies,  information).

## 2023-10-22 NOTE — PLAN OF CARE
PRIMARY DIAGNOSIS: MAJOR NEUROCOGNITIVE DISORDER  OUTPATIENT/OBSERVATION GOALS TO BE MET BEFORE DISCHARGE:  ADLs back to baseline: Yes    Activity and level of assistance: SBA    Pain status: Pain free.    Return to near baseline physical activity: Yes     Discharge Planner Nurse   Safe discharge environment identified: No  Barriers to discharge: Yes       Entered by: Tamica Sanchez RN 10/22/2023 5:08 AM     Patient has been awake all shift. Did receive a one time dose of Seroquel 12.5mg for increased anxiety/agitation/restlessness. 1:1 sitter in place for suicidal ideation and safety. Up with SBA. IV saline locked. Currently patient is awake, sitting up rocking back and forth in bed but is calm and cooperative and is watching TV.
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
## 2023-10-22 NOTE — PLAN OF CARE
PRIMARY DIAGNOSIS: MAJOR NEUROCOGNITIVE DISORDER  OUTPATIENT/OBSERVATION GOALS TO BE MET BEFORE DISCHARGE:  ADLs back to baseline: Yes    Activity and level of assistance: Up with standby assistance.    Pain status: Pain free.    Return to near baseline physical activity: Yes     Discharge Planner Nurse   Safe discharge environment identified: No  Barriers to discharge: Yes       Entered by: Tamica Sanchez RN 10/22/2023 3:28 AM     Patient is alert and orientated to self only. She denies pain at this time. Currently is sitting on edge of bed and is calm and cooperative.IV is saline locked at this time. Receiving Rocephin once daily for acute cystitis. 1:1 sitter in place due to suicidal ideation.

## 2023-10-22 NOTE — PLAN OF CARE
"  Problem: Adult Inpatient Plan of Care  Goal: Plan of Care Review  Description: The Plan of Care Review/Shift note should be completed every shift.  The Outcome Evaluation is a brief statement about your assessment that the patient is improving, declining, or no change.  This information will be displayed automatically on your shift  note.  Outcome: Progressing     Problem: Adult Inpatient Plan of Care  Goal: Patient-Specific Goal (Individualized)  Description: You can add care plan individualizations to a care plan. Examples of Individualization might be:  \"Parent requests to be called daily at 9am for status\", \"I have a hard time hearing out of my right ear\", or \"Do not touch me to wake me up as it startles  me\".  Outcome: Progressing   Goal Outcome Evaluation:       VSS on RA. Pleasant, able to be redirected. Chest x-ray ordered and preformed, see results. PRN atarax given. Pt anxious, walking helps. 1:1 sitter maintained.  Continued ABX. Discharge pending.                  "
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
## 2023-10-23 NOTE — PROGRESS NOTES
"Writer assisted patient with calling her sister. Writer, bedside attendant and security present bedside during call. Patient appears very anxious and paranoid. Stating that staff is \"holding her\" while sitting on the bed. Patient appears very distressed. Staff reorienting patient and attempting to provide comfort.   "

## 2023-10-23 NOTE — PROGRESS NOTES
Patient appears to be sleeping at this time. Bedside attendant reported patient had just fallen asleep. Tylenol held due to patient sleeping.
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
## 2023-10-23 NOTE — UTILIZATION REVIEW
Admission Status; Secondary Review Determination   Under the authority of the Utilization Management Committee, the utilization review process indicated a secondary review on Aide Brooks. The review outcome is based on review of the medical records, discussions with staff, and applying clinical experience noted on the date of the review.   (x) Inpatient Status Appropriate - This patient's medical care is consistent with medical management for inpatient care and reasonable inpatient medical practice.     RATIONALE FOR DETERMINATION   57-year-old female with early onset dementia admitted with acute cystitis.  Urine culture shows greater than 100,000 E. coli with sensitivities to all tested antibiotics including all oral options.  Treated with Rocephin.  She had increased agitation and aggression and delusional thinking at home and assisted living/memory care placement has been recommended. Patient today with increased agitation and threatening to staff.  Has required in-house psych consult as not cooperative with telehealth.  Has needed 2 doses IV haldol this afternoon.  Remains threat to self and others.  Attending placed  hold for now.  Not stable for discharge as behaviors won't allow placement.    At the time of admission with the information available to the attending physician more than 2 nights Hospital complex care was anticipated, based on patient risk of adverse outcome if treated as outpatient and complex care required. Inpatient admission is appropriate based on the Medicare guidelines.   The information on this document is developed by the utilization review team in order for the business office to ensure compliance. This only denotes the appropriateness of proper admission status and does not reflect the quality of care rendered.   The definitions of Inpatient Status and Observation Status used in making the determination above are those provided in the CMS Coverage Manual, Chapter 1 and  Chapter 6, section 70.4.   Sincerely,   Jennyfer Kaye MD  Utilization Review  Physician Advisor  [facility]

## 2023-10-23 NOTE — PLAN OF CARE
Problem: Suicide Risk  Goal: Absence of Self-Harm  Intervention: Assess Risk to Self and Maintain Safety  Recent Flowsheet Documentation  Taken 10/23/2023 0754 by Yvonne Bellamy, RN  Enhanced Safety Measures:    at bedside   room near unit station     Problem: Violence Risk or Actual  Goal: Anger and Impulse Control  Outcome: Not Progressing  Intervention: Minimize Safety Risk  Recent Flowsheet Documentation  Taken 10/23/2023 0754 by Yvonne Bellamy, RN  Enhanced Safety Measures:    at bedside   room near unit station   Goal Outcome Evaluation:  Staff unable to redirect this pt, required multiple code greens today, see progress notes.

## 2023-10-23 NOTE — PROGRESS NOTES
Patient disoriented x4 at this time. Disorganized thinking. Patient agreeable to taking bedtime medication with pudding. Patient in bed at this time with bedside attendant. No complaints of pain.

## 2023-10-23 NOTE — PROGRESS NOTES
"PRIMARY DIAGNOSIS: \"GENERIC\" NURSING  OUTPATIENT/OBSERVATION GOALS TO BE MET BEFORE DISCHARGE:  ADLs back to baseline: No    Activity and level of assistance: Up with standby assistance.    Pain status: Pain free.    Return to near baseline physical activity: No     Discharge Planner Nurse   Safe discharge environment identified: No  Barriers to discharge: Yes       Entered by: Yvonne Bellamy RN 10/23/2023 2:56 PM     Please review provider order for any additional goals.   Nurse to notify provider when observation goals have been met and patient is ready for discharge.    Pt continues to be aggressive and physical with staff.  Continues on 1:1 sitter for behaviors.   "

## 2023-10-23 NOTE — PROGRESS NOTES
Patient continues to be disoriented X4. Denies pain. Is resting in bed comfortably. Bedside attendant reports patient had been sleeping between cares.

## 2023-10-23 NOTE — CONSULTS
"Triage and Transition - Consult and Liaison   [phone]  October 23, 2023      Aide Brooks  1965    Plan:   [facility]  Pt alert to self only, disoriented to situation, place, time, at times agitated, unable to meet via telehealth at this point in time, per care team.  Care team indicates med management needs, psych provider updated.   Next steps include: Psychiatry Provider follow-up.           Presenting problem, including what brought patient to hospital: Aide Brooks is followed related to \"Paranoia, hallucinations, psychotic features of reported Alzheimer's\"    Reason for consult: Requested by Martinez Gillis MD .      Reason for inability to complete assessment with patient:  Care team states pt unable to meet via telehealth, needing in-person delirium/agitation med management\". Psych provider updated.    Historical information:     Information largely gathered from chart review.    Per chart review, pt with a hx of major neurocognitive dx, early onset dementia w/mood disturbance, panic hx, and unspecified depression, with information available in epic ehr at the time of this note.    Pt noted to be alert to self only, disoriented to situation, place, time per RN and chart noting.   Pt chart noting indicates \"Progressive dementia/Alzheimer's disease Increasing agitation/violence/delusions/confusion\".      Pt with meds including seroquel and remeron, per chart review.    Given unable to meet with pt today via telehealth, and the pts level of disorientation, in addition to consult reason and med management needs, will ask psychiatry provider to follow-up with pt, psych provider notified, left message.       Medications: Per ehr at the time of this note.  Current Facility-Administered Medications   Medication    acetaminophen (TYLENOL) tablet 975 mg    aspirin EC tablet 81 mg    cefTRIAXone (ROCEPHIN) 1 g vial to attach to  mL bag for ADULTS or NS 50 mL bag " "for PEDS    cyanocobalamin (VITAMIN B-12) tablet 500 mcg    haloperidol lactate (HALDOL) injection 2 mg    haloperidol lactate (HALDOL) injection 5 mg    melatonin tablet 3 mg    memantine (NAMENDA) tablet 10 mg    mirtazapine (REMERON) tablet 15 mg    pantoprazole (PROTONIX) EC tablet 40 mg    QUEtiapine (SEROquel) half-tab 12.5 mg    QUEtiapine (SEROquel) half-tab 12.5 mg    Vitamin D3 (CHOLECALCIFEROL) tablet 25 mcg     Medications Prior to Admission   Medication Sig Dispense Refill Last Dose    aspirin 81 MG EC tablet Take 81 mg by mouth daily   10/19/2023 at am    cholecalciferol (VITAMIN D3) 25 mcg (1000 units) capsule Take 1 capsule by mouth daily   10/19/2023 at am    cyanocobalamin (VITAMIN B-12) 500 MCG tablet Take 500 mcg by mouth daily   10/19/2023 at am    memantine (NAMENDA) 10 MG tablet Take 10 mg by mouth 2 times daily   10/19/2023 at pm    mirtazapine (REMERON) 15 MG tablet Take 15 mg by mouth At Bedtime   10/19/2023 at pm    Multiple Vitamins-Minerals (MULTIVITAL PO) Take 1 tablet by mouth daily   10/19/2023 at am    omeprazole (PRILOSEC) 20 MG DR capsule Take 20 mg by mouth daily as needed   10/19/2023 at 1300    QUEtiapine (SEROQUEL) 25 MG tablet Take 0.5 tab in the morning and 1 tablet in the evening   10/19/2023 at pm       Collateral information:   Reviewed chart, coordinated with care team, coordinated with MANUELA Campos\", and coordinated with Psychiatry provider for pt follow-up, psych provider notified, left message.      Update provided to care team including MANUELA NELSON, Psychotherapist Trainee  Triage and Transition - Consult and Liaison   [phone]   "

## 2023-10-23 NOTE — PROGRESS NOTES
SW connected with psych; plans to complete consult/assessment today with pt and will follow up with SW once completed.  9:19 AM    SW briefly spoke with spouse to inquire about HCD and stated that he will call back as he was on the phone with floor staff. Psych requested to see pt as soon as possible. Care management following.  2:19 PM    RUMA Humphreys  10/23/2023
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
## 2023-10-23 NOTE — PROVIDER NOTIFICATION
"Patient suddenly very anxious and paranoid. Bedside attendant called nurse into the room. Patient appeared very anxious and in distress, expressing paranoid thoughts. Patient stating that staff is \"trying to kill her\" and that \"it's not right\" and stating that she is hearing voices. Staff attempting to reorient patient and use calming techniques such as deep breaths and asking about family. Patient not redirectable and only restating paranoias.   Security called.   Provider paged regarding new sudden paranoia and non being non redirectable, requested provider to come bedside regarding new AMS and aggressive behavior towards staff.     Update: Patient requested staff assist in calling her sister April, April was called and helped calm down patient. Patient was then agreeable to taking oral medications such as seroquel. Patient now much calmer and directable while speaker to her sister. Provider assessed patient bedside at this time.   "
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
## 2023-10-23 NOTE — PROGRESS NOTES
This writer called pt's , Jose, to update him regarding the behavioral escalation event that happened at approximately 0645a (see other progress notes regarding this event).  Pt has been content since the event happened this morning. She was agreeable to medications.      Pt's  is wondering when he can come and see her.  Informed him that today may not be in her best interest, but we can certainly reassess tomorrow and see if he can come visit.      Paged case management requesting they call the pt's  and they responded immediately stating they will call him.     Paged Dr. Gillis requesting he call the pt's  when he has a moment to provide updates.

## 2023-10-24 NOTE — PROGRESS NOTES
Care Management Follow Up    Length of Stay (days): 1    Expected Discharge Date: 10/25/2023     Concerns to be Addressed:       Patient plan of care discussed at interdisciplinary rounds: Yes    Anticipated Discharge Disposition:  Home with home care services vs memory care         Referrals Placed by CM/SW:    Private pay costs discussed: Not applicable    Additional Information:  Spoke to pt's spouse and son by phone.  Discussed possible options for pt at discharge including memory care facility or home with family and home care.  Son is contacting home care agencies regarding extended hours/private pay home care services.  Son also has been researching memory care facilities.   and son asking questions regarding paying for Memory Care facilities and financial assistance.  Discussed applying for medical assistance/Elderly waiver through the On license of [facility].  Son has copy of application and will be contacting the On license of [facility] and completing application.    GIANNA Jeong
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
## 2023-10-24 NOTE — PLAN OF CARE
Problem: Violence Risk or Actual  Goal: Anger and Impulse Control  Outcome: Not Progressing  Intervention: Minimize Safety Risk  Intervention: Promote Self-Control   Goal Outcome Evaluation:  Pt alert to self, VSS.  Multiple notes written today regarding pt's behavior.  She continues to be aggressive with staff, yelling, kicking, swinging, throwing the chair in her room, throwing things at the 1:1 sitter, etc.  She has received 2.5mg Zyprexa ODT and 2.5mg IM Zyprexa and this has done nothing to her behaviors or aggression.  Pt was agreeable this morning to taking her medications, however as she ramped up she was not willing to take any medications orally.

## 2023-10-24 NOTE — PLAN OF CARE
"  Problem: Adult Inpatient Plan of Care  Goal: Patient-Specific Goal (Individualized)  Description: You can add care plan individualizations to a care plan. Examples of Individualization might be:  \"Parent requests to be called daily at 9am for status\", \"I have a hard time hearing out of my right ear\", or \"Do not touch me to wake me up as it startles  me\".  Outcome: Not Progressing   Goal Outcome Evaluation:    Nurse assumed care at 2300. Patient did not sleep much during shift.   Patient seemed anxious during majority of shift stating that \"this isn't right\" and \"you're here to hurt me\", referring to staff. Staff tried to reorient patient and assure the patient that staff is here to care for her and make her comfortable.   Patient did walk in the halls and stated she enjoyed stretching out.  Sitter at bedside during shift.   Denies pain.       "

## 2023-10-24 NOTE — PROGRESS NOTES
Returned call to pt's  and son.  They are eager to see her, however I'd be concerned that her behaviors might escalate if they come to see her and then they leave, she would want to leave with them.  Both pt's  and son understand the situation and are agreeable to postponing visits for now.  Encouraged to call back with any questions or concerns.
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
## 2023-10-24 NOTE — PROGRESS NOTES
"Received written orders from Dr. Ford regarding Zyprexa 2.5mg PO, she would not take this medication from Susan Adams RN.  Obtained an order for 2.5mg IM Zyprexa.      Writer was able to give IM Zyprexa without any physical harm, she did yell \"you fucking bitch, don't fucking do that.\"    Pt remains on 1:1.  "
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
## 2023-10-24 NOTE — PLAN OF CARE
Problem: Violence Risk or Actual  Goal: Anger and Impulse Control  Outcome: Not Progressing  Intervention: Minimize Safety Risk  Recent Flowsheet Documentation  Enhanced Safety Measures:    at bedside   room near unit station   Goal Outcome Evaluation:        Patients behaviors varies. She can be comfortable in recliner watching romance movies and then become agitated, unable to redirect or calm. She seems paranoid, needs reassurance of place, situation, time. After numerous attempts was agreeable to take evening oral medications, but only if all given at one time and then wanted nurse to leave her alone. Patient currently sleeping in recliner and remains on a 1:1    Psychiatry consulted and did chart review with medication adjustment, see consult note.     Health officer hold placed 10/23/23 1430 by S.

## 2023-10-24 NOTE — PROGRESS NOTES
"At approximately 1535 this pt started ramping up again, she started saying \"no, no, no, no, no, no, you're trying to kill me.\"  She just kept repeating this.      Provided pt with PRN 2.5mg Zyprexa.  "

## 2023-10-25 NOTE — PLAN OF CARE
Problem: Suicide Risk  Goal: Absence of Self-Harm  Outcome: Progressing     Problem: Violence Risk or Actual  Goal: Anger and Impulse Control  Outcome: Progressing   Pt disoriented x4. Can be redirectable at times and is very restless overnight, almost no sleep. 1:1 for safety and impulsivity. Endorsing some aching pain in her back, tylenol provided. Did take her PM medications.

## 2023-10-25 NOTE — PROGRESS NOTES
Updated pt's  (Jamie) regarding this evenings events.  Discussed plan for tomorrow morning regarding when he and (son or daughter) come to visit.  If he comes with one of the children the MD requested they should only visit 1 at a time.

## 2023-10-25 NOTE — PROGRESS NOTES
Care Management Follow Up    Length of Stay (days): 2    Expected Discharge Date: 10/26/2023     Concerns to be Addressed: discharge planning, care progression      Patient plan of care discussed at interdisciplinary rounds: No    Anticipated Discharge Disposition:  Memory care vs home with private duty home services     Anticipated Discharge Services:    Anticipated Discharge DME:  Per Treatment Team    Patient/family educated on Medicare website which has current facility and service quality ratings:    Education Provided on the Discharge Plan:    Patient/Family in Agreement with the Plan:      Referrals Placed by CM/SW:  Assisted Living Facilities  Private pay costs discussed:  not at this time    Additional Information:  Chart reviewed.  Pt remains on 1:1 sitter.  Psych following.    Met with pt and spouse to discuss discharge planning.  Spouse states his son, Martinez, has been assisting with discharge planning.  He states they have found a year long wait for memory care, but did not specify location.  He confirms his son is also contacting home care agencies regarding private pay services and spouse states he does not know what son has been able to arrange yet.  Spouse states his preference is to bring pt home and he will provide cares with extra assistance from private duty home services.    Call placed to Martinez kovacs, and left message requesting return call.    1:20 PM  Received update from hospitalist that plan is to discharge home with spouse today and hospitalist is ok with pt discharging without home services being set up.  She states Psychiatry has also given clearance for pt to discharge today.    1:40 PM  Spoke with sonMartinez, via phone regarding discharge planning.  He states he spoke with one memory care facility and is planning on contacting more.  He has left messages with some private pay home care agencies and received call back from one agency and is waiting for more calls.  He states there is  waiting time no matter which option they pursue.  The memory care facility had a one year wait list.  The private pay agencies will need to make an assessment visit and evaluate staffing.  So, he does not have a plan finalized yet.  Discussed plan per hospitalist about discharging today.  He states he was hoping to have a couple more days to work on this, but will continue to help his parents for find assistance.    Received message from Meagan Turner and she emailed resources and information to writer to provide to pt's family.  Printed out this email and brought to pt's room and placed with other paperwork. No family in room.    2:34 PM  Update provided to pt's bedside RN on discharge plan.    Waleska Lockwood RN

## 2023-10-25 NOTE — DISCHARGE INSTRUCTIONS
Appointment Scheduled:    Date: Wednesday, 11/1/2023  Time: 1:00 pm - 2:00 pm  [facility] PICKENS  [facility]  Location: [facility], [address] [place] [id number]  Phone: [phone]  Type: Therapy In Person    Please check your email prior to appointment for additional paperwork. Please [phone] if you have more questions.
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
## 2023-10-25 NOTE — PROGRESS NOTES
Triage & Transition Services     Patient scheduled for individual therapy. Writer contacted  to assist in scheduling. Discharge paperwork updated with information.     Date: Wednesday, 11/1/2023  Time: 1:00 pm - 2:00 pm  Provider: Selin PICKENS  [facility]  Location: [place] [address]  Phone: [phone]  Type: Teletherapy      Angella Kulkarni  Triage & Transition Services

## 2023-10-27 NOTE — PROGRESS NOTES
Connected Care Resource Center Contact  [facility]/Voicemail     Clinical Data: Post-Discharge Outreach     Outreach attempted x 2.  Left message on patient's voicemail, providing [facility]'s central phone number of [phone] ([phone]) for questions/concerns and/or to schedule an appt with an [facility] provider, if they do not have a PCP.      Plan:  [facility] will do no further outreaches at this time.       BETY Alexander  Connected Care Resource Center, [facility]    *Connected Care Resource Team does NOT follow patient ongoing. Referrals are identified based on internal discharge reports and the outreach is to ensure patient has an understanding of their discharge instructions.

## 2023-10-27 NOTE — PROGRESS NOTES
I called Mr. Brooks about her thiamine level < 2. I sent thiamine 500mg x 5 days then 100mg thereafter. She will need to see PCP in a month.

## 2023-11-09 PROBLEM — R45.1 AGITATION: Status: ACTIVE | Noted: 2023-01-01

## 2023-11-09 PROBLEM — F03.918 DEMENTIA WITH BEHAVIORAL DISTURBANCE (H): Status: ACTIVE | Noted: 2023-01-01

## 2023-11-09 NOTE — PROGRESS NOTES
Assessment:       Confusion      Hypotension, unspecified hypotension type      Alzheimer's dementia, unspecified dementia severity, unspecified timing of dementia onset, unspecified whether behavioral, psychotic, or mood disturbance or anxiety (H)      Hematuria, unspecified type      Chest pain, unspecified type      Abdominal pain, generalized           Plan:     Patient with Alzheimer's dementia brought in by her  and son with recent hospitalization discharged on 10/25/2023 for agitation and UTI now with several day history of complaints of chest pain, abdominal pain, and significantly increased confusion as well as blood in her urine.  She is significantly hypotensive here today in the clinic.  Concerned about possible sepsis, possibly urosepsis causing increased confusion and hypotension but etiology is difficult to determine given patient's Alzheimer's and complaints of chest pain and abdominal pain as well.  I do think she needs to go over to the emergency department for further evaluation.  Discussed case with the ED physician at [facility] emergency department who agrees patient needs to be seen for higher level of care for further evaluation and treatment.        History obtained by independent historian: Patient's son and .    Subjective:       57 year old female with Alzheimer's dementia brought in by her  and son presents for evaluation of a several day history of chest pain, abdominal pain, hematuria, and significantly worsening confusion.  She is not able to provide a history for herself.  She was recently hospitalized from 10/20/2023 through 10/25/2023 for hematuria and agitation and was found to have a UTI.  She has not had a fever that her son and  know of.  No shortness of breath or wheezing.  She has not had any vomiting or diarrhea.    Patient Active Problem List   Diagnosis    Abnormal weight loss    Decreased GFR    Dementia (H)    Globus sensation     History of cervical cancer    History of renal cell carcinoma    HTN (hypertension)    Kidney lesion    Renal mass    Anxiety    Panic attack    Domestic concerns    History of dementia    Hypothyroidism, unspecified type    Major neurocognitive disorder (H)       Past Medical History:   Diagnosis Date    Cervical cancer (H)     Hypertension        Past Surgical History:   Procedure Laterality Date    HYSTERECTOMY         Current Outpatient Medications   Medication    aspirin 81 MG EC tablet    cephALEXin (KEFLEX) 500 MG capsule    cholecalciferol (VITAMIN D3) 25 mcg (1000 units) capsule    cyanocobalamin (VITAMIN B-12) 500 MCG tablet    melatonin 3 MG tablet    memantine (NAMENDA) 10 MG tablet    mirtazapine (REMERON) 15 MG tablet    Multiple Vitamins-Minerals (MULTIVITAL PO)    omeprazole (PRILOSEC) 20 MG DR capsule    prazosin (MINIPRESS) 1 MG capsule    QUEtiapine (SEROQUEL) 25 MG tablet    QUEtiapine (SEROQUEL) 25 MG tablet    thiamine (B-1) 100 MG tablet    vitamin B1 (THIAMINE) 250 MG tablet     No current facility-administered medications for this visit.       No Known Allergies    No family history on file.    Social History     Socioeconomic History    Marital status:    Tobacco Use    Smoking status: Every Day         Review of Systems  Pertinent items are noted in HPI.      Objective:                 General Appearance:    BP (!) 78/54   Pulse 92   Temp 98.2  F (36.8  C) (Oral)   Resp 16   Wt 88 kg (194 lb)   SpO2 94%   BMI 31.31 kg/m          Alert, calm.  Does not provide any history at all.  Does not appear to be agitated.  No respiratory distress.   Head:    Normocephalic, without obvious abnormality, atraumatic   Eyes:    Conjunctiva/corneas clear   Ears:    Normal TM's without erythema or bulging. Normal external ear canals, both ears   Nose:   Nares normal, septum midline, mucosa normal, no drainage    or sinus tenderness   Throat:   Lips, mucosa, and tongue normal; teeth and gums  normal.  No tonsilar hypertrophy or exudate.   Neck:   Supple, symmetrical, trachea midline, no adenopathy    Lungs:     Clear to auscultation bilaterally without wheezes, rales, or rhonchi, respirations unlabored    Heart:    Regular rate and rhythm, S1 and S2 normal, no murmur, rub or gallop                          Abdomen: Soft, nontender   Extremities:   Extremities normal, atraumatic, no cyanosis or edema   Skin:   Skin color, texture, turgor normal, no rashes or lesions         This note has been dictated using voice recognition software. Any grammatical or context distortions are unintentional and inherent to the software

## 2023-11-09 NOTE — ED PROVIDER NOTES
EMERGENCY DEPARTMENT ENCOUNTER     NAME: Aide Brooks   AGE: 57 year old female   YOB: 1965   MRN: [id number]   EVALUATION DATE & TIME: No admission date for patient encounter.   PCP: Glenny Baron     Chief Complaint   Patient presents with    Chest Pain    UTI   :    FINAL IMPRESSION       1. Agitation    2. Dementia with behavioral disturbance (H)           ED COURSE & MEDICAL DECISION MAKING      Pertinent Labs & Imaging studies reviewed. (See chart for details)   57 year old female  presents to the Emergency Department for evaluation of a number of medical complaints or family was worried about potential UTI, thinking she might be having chest pain or abdominal pain.  During her ED course, it became clear that the bigger concern is being able to care for her at home and having behavioral outbursts. Initial Vitals Reviewed. Initial exam notable for patient who was calm and cooperative initially, but before we could get her to a space to draw any labs or start any testing she started getting very anxious and agitated, yelling at family members, could not be verbally redirected and even needed IM Ativan to be able to proceed with any testing.  I have a lower suspicion that there is a medical source of symptoms and I did do a lab work-up which does not show anything scary like pancreatitis, ACS.  I looked in her chart and she was admitted for increased agitation on October 20.  On October 25, she ended up going home with family awaiting been getting her into a memory care, and they have still been unable to do so.  Her  is quite distraught stating that he cannot continue to care for her in the home when she has this agitated and she does seem to put a lot of her aggression towards him when her behavioral disturbance starts.  A CT of the abdomen and pelvis is generally unremarkable and she has no right upper quadrant tenderness on exam.  At this time, we will plan for observation admission  due to agitation.  Case discussed with hospitalist Dr. Hemphill who excepted the patient for admission.           At the conclusion of the encounter I discussed the results of all of the tests and the disposition. The questions were answered. The patient or family acknowledged understanding and was agreeable with the care plan.     0 minutes critical care time, see procedure note below for details if relevant    2:05 PM Met with and introduced myself to the patient. Discussed history and plan of care.   5:52 PM I spoke to the patient's  and had updated him regarding the plan of care for her.   6:50 PM Spoke with Dr. Hemphill, Hospitalist, regarding plan for admission.    Medical Decision Making    History:  Supplemental history from: Family Member/Significant Other  External Record(s) reviewed: Other: Chart review of 11/9/23 at [facility].    Work Up:  Chart documentation includes differential considered and any EKGs or imaging independently interpreted by provider, where specified.  In additional to work up documented, I considered the following work up: Documented in chart, if applicable.    External consultation:  Discussion of management with another provider: Hospitalist    Complicating factors:  Care impacted by chronic illness: Hypertension, Mental Health, and Other: Hypothyroidism  Care affected by social determinants of health: N/A    Disposition considerations: Admit.        MEDICATIONS GIVEN IN THE EMERGENCY:   Medications   LORazepam (ATIVAN) injection 1 mg (1 mg Intramuscular $Given 11/9/23 1515)   iopamidol (ISOVUE-370) solution 75 mL (75 mLs Intravenous $Given 11/9/23 1815)      NEW PRESCRIPTIONS STARTED AT TODAY'S ER VISIT   New Prescriptions    No medications on file     ================================================================   HISTORY OF PRESENT ILLNESS       Patient information was obtained from: Son   Use of Intrepreter: N/A   Aide Brooks is a 57  year old female with history of dementia, hypertension, and hypothyroidism who presents to the ED by walk in for evaluation and chest pain.      Per son reports that his mother has been having chest pain and abdominal pain for almost two weeks. The pain in her abdomen and chest has been interrupting her in her sleep. He has also noticed that there was blood in his mother's urine. Today they had gone to urgent care to have his mother get checked up on and while at urgent care she was found to have low blood pressure and was sent to the ED for further evaluation. Although while here at the ED, her blood pressure was high. To add on, per son explains that his mother was hospitalized a couple of weeks ago for a UTI and was prescribed antibiotics, she is still taking them.     Per chart review patient presented to [facility] on 11/9/23 for evaluation of chest pain and abdominal pain. The patient was found to be hypotensive and was sent to the ED for further evaluation.      ================================================================        PAST HISTORY     PAST MEDICAL HISTORY:   Past Medical History:   Diagnosis Date    Cervical cancer (H)     Hypertension       PAST SURGICAL HISTORY:   Past Surgical History:   Procedure Laterality Date    HYSTERECTOMY        CURRENT MEDICATIONS:   aspirin 81 MG EC tablet  cephALEXin (KEFLEX) 500 MG capsule  cholecalciferol (VITAMIN D3) 25 mcg (1000 units) capsule  cyanocobalamin (VITAMIN B-12) 500 MCG tablet  melatonin 3 MG tablet  memantine (NAMENDA) 10 MG tablet  mirtazapine (REMERON) 15 MG tablet  Multiple Vitamins-Minerals (MULTIVITAL PO)  omeprazole (PRILOSEC) 20 MG DR capsule  prazosin (MINIPRESS) 1 MG capsule  QUEtiapine (SEROQUEL) 25 MG tablet  QUEtiapine (SEROQUEL) 25 MG tablet  thiamine (B-1) 100 MG tablet  vitamin B1 (THIAMINE) 250 MG tablet      ALLERGIES:   No Known Allergies   FAMILY HISTORY:   History reviewed. No pertinent family  history.   SOCIAL HISTORY:   Social History     Socioeconomic History    Marital status:    Tobacco Use    Smoking status: Every Day        VITALS  Patient Vitals for the past 24 hrs:   BP Temp Temp src Pulse Resp SpO2 Weight   11/09/23 1331 (!) 162/76 98.6  F (37  C) Temporal 88 18 94 % 88.9 kg (196 lb)        ================================================================    PHYSICAL EXAM     VITAL SIGNS: BP (!) 162/76   Pulse 88   Temp 98.6  F (37  C) (Temporal)   Resp 18   Wt 88.9 kg (196 lb)   SpO2 94%   BMI 31.64 kg/m     Constitutional:  Awake, no acute distress Pleasantly confused.   HENT:  Atraumatic, oropharynx without exudate or erythema, membranes moist  Lymph:  No adenopathy  Eyes: EOM intact, PERRL, no injection  Neck: Supple  Respiratory:  Clear to auscultation bilaterally, no wheezes or crackles   Cardiovascular:  Regular rate and rhythm, single S1 and S2   GI:  Soft, nontender, nondistended, no rebound or guarding   Musculoskeletal:  Moves all extremities, no lower extremity edema, no deformities    Skin:  Warm, dry  Neurologic:  Alert and oriented x3, no focal deficits noted       ================================================================  LAB       All pertinent labs reviewed and interpreted.   Labs Ordered and Resulted from Time of ED Arrival to Time of ED Departure   COMPREHENSIVE METABOLIC PANEL - Abnormal       Result Value    Sodium 142      Potassium 3.8      Carbon Dioxide (CO2) 27      Anion Gap 11      Urea Nitrogen 16.6      Creatinine 1.09 (*)     GFR Estimate 58 (*)     Calcium 10.3 (*)     Chloride 104      Glucose 110 (*)     Alkaline Phosphatase 85      AST 18      ALT 13      Protein Total 7.2      Albumin 4.4      Bilirubin Total 0.5     TROPONIN T, HIGH SENSITIVITY - Abnormal    Troponin T, High Sensitivity 16 (*)    ROUTINE UA WITH MICROSCOPIC REFLEX TO CULTURE - Abnormal    Color Urine Colorless      Appearance Urine Clear      Glucose Urine Negative       Bilirubin Urine Negative      Ketones Urine Negative      Specific Gravity Urine 1.008      Blood Urine Negative      pH Urine 5.0      Protein Albumin Urine Negative      Urobilinogen Urine <2.0      Nitrite Urine Negative      Leukocyte Esterase Urine Negative      Bacteria Urine Few (*)     Mucus Urine Present (*)     RBC Urine <1      WBC Urine 1      Squamous Epithelials Urine 10 (*)    CBC WITH PLATELETS AND DIFFERENTIAL - Abnormal    WBC Count 7.9      RBC Count 4.62      Hemoglobin 13.1      Hematocrit 41.5      MCV 90      MCH 28.4      MCHC 31.6      RDW 15.3 (*)     Platelet Count 185      % Neutrophils 78      % Lymphocytes 15      % Monocytes 7      % Eosinophils 0      % Basophils 0      % Immature Granulocytes 0      NRBCs per 100 WBC 0      Absolute Neutrophils 6.1      Absolute Lymphocytes 1.2      Absolute Monocytes 0.5      Absolute Eosinophils 0.0      Absolute Basophils 0.0      Absolute Immature Granulocytes 0.0      Absolute NRBCs 0.0     LIPASE - Normal    Lipase 39     N TERMINAL PRO BNP OUTPATIENT - Normal    N Terminal Pro BNP Outpatient 41     LACTIC ACID WHOLE BLOOD - Normal    Lactic Acid 0.7     BLOOD CULTURE   BLOOD CULTURE        ===============================================================  RADIOLOGY       Reviewed all pertinent imaging. Please see official radiology report.   CT Chest/Abdomen/Pelvis w Contrast   Preliminary Result   IMPRESSION:   1.  Large calcified gallstone. Gallbladder wall thickening. Gallbladder ultrasound recommended in further evaluation.      2.  Emphysema.      3.  Partial right upper nephrectomy.       4.  Low-attenuation bilateral adrenal nodules compatible with adenomata.      5.  Hysterectomy.      Head CT w/o contrast   Final Result   IMPRESSION:   1.  No CT evidence for acute intracranial process.   2.  Brain atrophy and presumed chronic microvascular ischemic changes as above.             ================================================================  EKG     EKG reviewed interpreted by me shows sinus rhythm with rate of 78, normal axis, QTc 417 with no acute ST or T wave changes    I have independently reviewed and interpreted the EKG(s) documented above.     ================================================================  PROCEDURES         I, Alisonno James, am serving as a scribe to document services personally performed by Dr. Jacinto based on my observation and the provider's statements to me. I, Irene Jacinto MD attest that Alison James is acting in a scribe capacity, has observed my performance of the services and has documented them in accordance with my direction.   Irene Jacinto M.D.   Emergency Medicine   [facility] EMERGENCY ROOM  [address] [address]  [phone]  Dept: [phone]        Irene Jacinto MD  11/09/23 1925
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
## 2023-11-09 NOTE — ED TRIAGE NOTES
Pt presents to the ED with c/o CP, abdominal pain, and UTI symptoms that have been going on for a few days. Pt seen at , and sent here for further evaluation. Hx of alzheimers, here with spouse and son. Son endorses that he has noticed increased swelling of ankles. Family states she has c/o of some SOB, but states it could be related to anxiety.      Triage Assessment (Adult)       Row Name 11/09/23 1554          Triage Assessment    Airway WDL WDL        Respiratory WDL    Respiratory WDL WDL        Skin Circulation/Temperature WDL    Skin Circulation/Temperature WDL WDL        Cardiac WDL    Cardiac WDL WDL        Peripheral/Neurovascular WDL    Peripheral Neurovascular WDL WDL        Cognitive/Neuro/Behavioral WDL    Cognitive/Neuro/Behavioral WDL WDL

## 2023-11-09 NOTE — ED NOTES
Expected Patient Referral to ED  1:22 PM    Referring Clinic/Provider:  Walk in clinic    Reason for referral/Clinical facts:  Hypotension, AMS, hematuria, UA symptoms.  PMHx dementia.  CP and ABD pain.      Recommendations provided:  Send to ED for further evaluation    Caller was informed that this institution does possess the capabilities and/or resources to provide for patient and should be transferred to our facility.    Discussed that if direct admit is sought and any hurdles are encountered, this ED would be happy to see the patient and evaluate.    Informed caller that recommendations provided are recommendations based only on the facts provided and that they responsible to accept or reject the advice, or to seek a formal in person consultation as needed and that this ED will see/treat patient should they arrive.      Eder Londono MD  [facility] EMERGENCY ROOM  [address] [place] [address]  [phone]      Eder Londono MD  11/09/23 8748

## 2023-11-10 NOTE — H&P
"[facility] MEDICINE ADMISSION HISTORY AND PHYSICAL     Assessment & Plan         57 year old into  hospital for acute agitation.  PMHx includes  Dementia, UTI, right partial nephrectomy due to renal cell carcinoma,   cervical cancer (post Hysterectomy),     Pt had hospital stay here from 10/20-10/25 for a UTI and agitation.  She  Was seen by psychiatry.  Pt is cared for by her .  He took her home  While awaiting memory care placement.  (The list is \"long\").      He brought her again today due to progressive agitation without associative  Additional symptoms.  (Vomiting, diarrhea, fevers, respiratory symptoms,  Sick contacts)    On my interview she is tearful and paranoid.  She sits in the bed and cries  Perseverating about her  abusing her though then denies it.  She   Follows simple commands and redirects.  The physical exam is remarkable  Only for her tearfulness.      [facility] ER diagnostics shows a creatinine of 1.09 (baseline ) along  With a normal white count, hemoglobin.  She had a troponin of 16 at  1730.  She was pan scanned for unclear reasons.  She has a gallstone  In the gallbladder that is large and calcified.  She has gallbladder wall  Thickening.  The exam is unremarkable.     Pts  (POA) went home.  She is being admitted for her progressive  Dementia and associative symptoms. I will add an abdominal ultrasound   (Limited) for further screening.    I will connect with .         Problem list:     Dementia with agitation: continue home meds with scheduled  Zyprexa (though up to this hospital stay she was on  Seroquel); increase the dose at night; 1:1 sitter; case  Management for discussion regarding disposition  (Dementia work up already done on previous visits)  Gallstone:  ultrasound now  History of depression  CKD: avoid nephrotoxins, no NSAIDS,   History of nightmares: continue home prazosin (before sleep)  Dvt prevention:  lovenox "           Chief Complaint  agitation         Past Medical History      Dementia, progressive alzheimers  HTN  History of RCC with partial right nephrectomy  History of cervical cancer      Surgical History     Past Surgical History:   Procedure Laterality Date    HYSTERECTOMY       Family History    Reviewed, and History reviewed. No pertinent family history.     Social History      Social History     Tobacco Use    Smoking status: Every Day        Allergies   No Known Allergies  Prior to Admission Medications      Prior to Admission Medications   Prescriptions Last Dose Informant Patient Reported? Taking?   Multiple Vitamins-Minerals (MULTIVITAL PO)   Yes No   Sig: Take 1 tablet by mouth daily   QUEtiapine (SEROQUEL) 25 MG tablet   No No   Sig: Take 0.5 tablets (12.5 mg) by mouth 2 times daily   QUEtiapine (SEROQUEL) 25 MG tablet   No No   Sig: Take 1 tablet (25 mg) by mouth at bedtime   aspirin 81 MG EC tablet   Yes No   Sig: Take 81 mg by mouth daily   cephALEXin (KEFLEX) 500 MG capsule   No No   Sig: Take 1 capsule (500 mg) by mouth every 12 hours   cholecalciferol (VITAMIN D3) 25 mcg (1000 units) capsule   Yes No   Sig: Take 1 capsule by mouth daily   cyanocobalamin (VITAMIN B-12) 500 MCG tablet   Yes No   Sig: Take 500 mcg by mouth daily   melatonin 3 MG tablet   No No   Sig: Take 1 tablet (3 mg) by mouth daily (with dinner)   memantine (NAMENDA) 10 MG tablet   Yes No   Sig: Take 10 mg by mouth 2 times daily   mirtazapine (REMERON) 15 MG tablet   Yes No   Sig: Take 15 mg by mouth At Bedtime   omeprazole (PRILOSEC) 20 MG DR capsule   Yes No   Sig: Take 20 mg by mouth daily as needed   prazosin (MINIPRESS) 1 MG capsule   No No   Sig: Take 1 capsule (1 mg) by mouth at bedtime   thiamine (B-1) 100 MG tablet   No No   Sig: Take 1 tablet (100 mg) by mouth daily   vitamin B1 (THIAMINE) 250 MG tablet   No No   Sig: Take 2 tablets (500 mg) by mouth daily      Facility-Administered Medications: None      Review of  Systems     A 12 point comprehensive review of systems was negative except as noted above in HPI.    PHYSICAL EXAMINATION       Vitals      Temp:  [98.2  F (36.8  C)-98.6  F (37  C)] 98.6  F (37  C)  Pulse:  [88-92] 88  Resp:  [16-18] 18  BP: ()/(54-76) 162/76  SpO2:  [94 %] 94 %    Examination     GENERAL:  Alert, tearful; cooperative enough; obvious dementia;    HEAD:  Normocephalic, without obvious abnormality, atraumatic   EYES:  PERRL, conjunctiva/corneas clear, no scleral icterus, EOM's intact   NOSE: Nares normal, septum midline, mucosa normal, no drainage   THROAT: Lips, mucosa, and tongue normal; teeth and gums normal, mouth moist   NECK: Supple, symmetrical, trachea midline   BACK:   Symmetric, no curvature, ROM normal   LUNGS:   Clear to auscultation bilaterally, no rales, rhonchi, or wheezing, symmetric chest rise on inhalation, respirations unlabored   CHEST WALL:  No tenderness or deformity   HEART:  Regular rate and rhythm, S1 and S2 normal, no murmur, rub, or gallop    ABDOMEN:   Soft, non-tender, bowel sounds active all four quadrants, no masses, no organomegaly, no rebound or guarding   EXTREMITIES: Extremities normal, atraumatic, no cyanosis or edema    SKIN: Dry to touch, no exanthems in the visualized areas   NEURO: Oriented to name only; moves all 4 extremities; not gaited   PSYCH: Tearful; consolable     Pertinent Lab         Pertinent Radiology     Radiology Results:   Recent Results (from the past 24 hour(s))   Head CT w/o contrast    Narrative    EXAM: CT HEAD W/O CONTRAST  LOCATION: [facility]  DATE: 11/9/2023    INDICATION: Confusion, agitation.  COMPARISON: Head CT 10/20/2023.  TECHNIQUE: Routine CT Head without IV contrast. Multiplanar reformats. Dose reduction techniques were used.    FINDINGS:  INTRACRANIAL CONTENTS: No intracranial hemorrhage, extraaxial collection, or mass effect.  No CT evidence of acute infarct. Mild presumed chronic small vessel  ischemic changes. Moderate generalized volume loss. No hydrocephalus.     VISUALIZED ORBITS/SINUSES/MASTOIDS: No intraorbital abnormality. No paranasal sinus mucosal disease. No middle ear or mastoid effusion.    BONES/SOFT TISSUES: No acute abnormality.      Impression    IMPRESSION:  1.  No CT evidence for acute intracranial process.  2.  Brain atrophy and presumed chronic microvascular ischemic changes as above.   CT Chest/Abdomen/Pelvis w Contrast    Narrative    EXAM: CT CHEST/ABDOMEN/PELVIS W CONTRAST  LOCATION: [facility]  DATE: 11/9/2023    INDICATION: CHEST AND ABD PAIN  COMPARISON: 07/08/2019 CT pulmonary angiogram.  TECHNIQUE: CT scan of the chest, abdomen, and pelvis was performed following injection of IV contrast. Multiplanar reformats were obtained. Dose reduction techniques were used.   CONTRAST: Isovue 370 75mL    FINDINGS:   LUNGS AND PLEURA: Emphysematous change. Linear regions of atelectasis in the right middle and lower lobes.    MEDIASTINUM/AXILLAE: Normal.    CORONARY ARTERY CALCIFICATION: None.    HEPATOBILIARY: There is a large calcified gallstone present. Gallbladder wall thickening. Gallbladder ultrasound recommended for further evaluation.    PANCREAS: Normal.    SPLEEN: Normal.    ADRENAL GLANDS: 1.5 cm low-attenuation nodule left adrenal gland compatible with adenoma. This is stable. Subcentimeter presumed adenoma right adrenal gland.    KIDNEYS/BLADDER: Partial nephrectomy upper pole right kidney.    BOWEL: Normal.    LYMPH NODES: Normal.    VASCULATURE: Unremarkable.    PELVIC ORGANS: Hysterectomy.    MUSCULOSKELETAL: Normal.      Impression    IMPRESSION:  1.  Large calcified gallstone. Gallbladder wall thickening. Gallbladder ultrasound recommended in further evaluation.    2.  Emphysema.    3.  Partial right upper nephrectomy.     4.  Low-attenuation bilateral adrenal nodules compatible with adenomata.    5.  Hysterectomy.       Advance Care Planning         Margie Hemphill MD  [facility] Medicine  [facility]   Phone: #[phone]

## 2023-11-10 NOTE — PROGRESS NOTES
PRIMARY DIAGNOSIS: Dementia  OUTPATIENT/OBSERVATION GOALS TO BE MET BEFORE DISCHARGE:  ADLs back to baseline: No    Activity and level of assistance: Up with standby assistance.    Pain status: Pain free.    Return to near baseline physical activity: Yes     Discharge Planner Nurse   Safe discharge environment identified: No  Barriers to discharge: Yes       Entered by: Rachael Cortez RN 11/10/2023 4:31 PM     Please review provider order for any additional goals.   Nurse to notify provider when observation goals have been met and patient is ready for discharge.    Pt agitated, getting out of bed, trying to find a way out of hospital. Difficult to reorient/redirect. Repeat demand to go home; does not know why she is here. 1 mg ativan administered.

## 2023-11-10 NOTE — PLAN OF CARE
"PRIMARY DIAGNOSIS: \"GENERIC\" NURSING  OUTPATIENT/OBSERVATION GOALS TO BE MET BEFORE DISCHARGE:  ADLs back to baseline: No    Activity and level of assistance: Up with standby assistance.    Pain status: Improved-controlled with oral pain medications.    Return to near baseline physical activity: No     Discharge Planner Nurse   Safe discharge environment identified: No  Barriers to discharge: Yes       Entered by: Josefina Skaggs RN 11/10/2023 5:42 AM   Pt alert to self. VSS. Pt tearful, restless, and paranoid overnight. Continues to try to get out of bed and states she needs to leave. Unsteady gait. Occasionally redirectable if you tell her she is safe and her son wants her to be here. Sitter at bedside. Started CO abdominal pain around 2330, MD aware and placed order for PRN tylenol. Pt required a dose of PRN ativan for restlessness and agitation.     "

## 2023-11-10 NOTE — PROGRESS NOTES
PRIMARY DIAGNOSIS: Dementia w/ behavior disturbance  OUTPATIENT/OBSERVATION GOALS TO BE MET BEFORE DISCHARGE:  ADLs back to baseline: No    Activity and level of assistance: Up with standby assistance.    Pain status: Pain free.    Return to near baseline physical activity: No     Discharge Planner Nurse   Safe discharge environment identified: No  Barriers to discharge: Yes       Entered by: Rachael Cortez RN 11/10/2023 9:53 AM     Please review provider order for any additional goals.   Nurse to notify provider when observation goals have been met and patient is ready for discharge.

## 2023-11-10 NOTE — PROGRESS NOTES
[facility] MEDICINE PROGRESS NOTE      Summary:  57 year old female into [facility] after presenting to the ER on 11/9/2023 for increasing agitation.  Pts baseline includes significant  Deficits due to ongoing alzheimers dementia.    Diagnostics in the ER were reassuring.  She has no blood test abnormalities  To explain her exacerbation.  There was a gallstone on CT that  Showed stability on a follow up ultrasound.      On my interview today she is the same as yesterday; tearful, paranoid. She  Requires a sitter in the room.    Pts  is tired.  He is often up at night.  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  Case  Management input is pending. He would like additional PCA services.   It is unclear is memory care is a realistic option due to cost     Hospital day number 1    Problem list:     Dementia, alzheimers; progressive: psych input appreciated;   Will add aricept and continue the zyprexa; avoid ativan, anticholinergics  Agitation due to number 1  History of UTI: positive culture on 10/20 with  Pansensitive e coli; treated with keflex; pts urinalysis  On admission without strong signal for infection; not cultured    4.  History of partial right nephrectomy: creatinine stable  5.  Gallstones: stable, nonacute  6.  Disposition:  pt will stay today for medication management          Margie Hemphill MD  [facility] Medicine  [facility]  Phone: #[phone]  Securely message with the Vocera Web Console (learn more here)  Text page via [employer] Paging/Directory     Interval History/Subjective:  tearful; oriented to name.      Physical Exam/Objective:  Temp:  [97.5  F (36.4  C)-97.9  F (36.6  C)] 97.9  F (36.6  C)  Pulse:  [86-94] 86  Resp:  [16] 16  BP: (146-151)/(74-82) 151/82  SpO2:  [95 %] 95 %  Body mass index is 31.64 kg/m .    GENERAL:  Alert, tearful, paranoid, anxious   HEAD:   Normocephalic, without obvious abnormality, atraumatic   EYES:  PERRL, conjunctiva/corneas clear, no scleral icterus, EOM's intact   NOSE: Nares normal, septum midline, mucosa normal, no drainage   THROAT: Lips, mucosa, and tongue normal; teeth and gums normal, mouth moist   NECK: Supple, symmetrical, trachea midline   BACK:   Symmetric, no curvature, ROM normal   LUNGS:   Clear to auscultation bilaterally, no rales, rhonchi, or wheezing, symmetric chest rise on inhalation, respirations unlabored   CHEST WALL:  No tenderness or deformity   HEART:  Regular rate and rhythm, S1 and S2 normal, no murmur, rub, or gallop    ABDOMEN:   Soft, non-tender, bowel sounds active all four quadrants, no masses, no organomegaly, no rebound or guarding   EXTREMITIES: Extremities normal, atraumatic, no cyanosis or edema    SKIN: Dry to touch, no exanthems in the visualized areas   NEURO: Alert, oriented x3, moves all four extremities freely   PSYCH: Cooperative, behavior is appropriate      Data reviewed today: I personally reviewed all new medications, labs, imaging/diagnostics reports over the past 24 hours. Pertinent findings include:    Imaging:   Recent Results (from the past 24 hour(s))   Head CT w/o contrast    Narrative    EXAM: CT HEAD W/O CONTRAST  LOCATION: [facility]  DATE: 11/9/2023    INDICATION: Confusion, agitation.  COMPARISON: Head CT 10/20/2023.  TECHNIQUE: Routine CT Head without IV contrast. Multiplanar reformats. Dose reduction techniques were used.    FINDINGS:  INTRACRANIAL CONTENTS: No intracranial hemorrhage, extraaxial collection, or mass effect.  No CT evidence of acute infarct. Mild presumed chronic small vessel ischemic changes. Moderate generalized volume loss. No hydrocephalus.     VISUALIZED ORBITS/SINUSES/MASTOIDS: No intraorbital abnormality. No paranasal sinus mucosal disease. No middle ear or mastoid effusion.    BONES/SOFT TISSUES: No acute abnormality.      Impression     IMPRESSION:  1.  No CT evidence for acute intracranial process.  2.  Brain atrophy and presumed chronic microvascular ischemic changes as above.   CT Chest/Abdomen/Pelvis w Contrast    Narrative    EXAM: CT CHEST/ABDOMEN/PELVIS W CONTRAST  LOCATION: [facility]  DATE: 11/9/2023    INDICATION: CHEST AND ABD PAIN  COMPARISON: 07/08/2019 CT pulmonary angiogram.  TECHNIQUE: CT scan of the chest, abdomen, and pelvis was performed following injection of IV contrast. Multiplanar reformats were obtained. Dose reduction techniques were used.   CONTRAST: Isovue 370 75mL    FINDINGS:   LUNGS AND PLEURA: Emphysematous change. Linear regions of atelectasis in the right middle and lower lobes.    MEDIASTINUM/AXILLAE: Normal.    CORONARY ARTERY CALCIFICATION: None.    HEPATOBILIARY: There is a large calcified gallstone present. Gallbladder wall thickening. Gallbladder ultrasound recommended for further evaluation.    PANCREAS: Normal.    SPLEEN: Normal.    ADRENAL GLANDS: 1.5 cm low-attenuation nodule left adrenal gland compatible with adenoma. This is stable. Subcentimeter presumed adenoma right adrenal gland.    KIDNEYS/BLADDER: Partial nephrectomy upper pole right kidney.    BOWEL: Normal.    LYMPH NODES: Normal.    VASCULATURE: Unremarkable.    PELVIC ORGANS: Hysterectomy.    MUSCULOSKELETAL: Normal.      Impression    IMPRESSION:  1.  Large calcified gallstone. Gallbladder wall thickening. Gallbladder ultrasound recommended in further evaluation.    2.  Emphysema.    3.  Partial right upper nephrectomy.     4.  Low-attenuation bilateral adrenal nodules compatible with adenomata.    5.  Hysterectomy.   US Abdomen Limited    Addendum: 11/9/2023    Addendum regarding follow-up for incidental pancreatic cystic lesion:   Recommend follow-up MRCP in 6 months per guidelines below.    REFERENCE:  Revisions of international consensus [place] guidelines for the management of IPMN of the pancreas.  Pancreatology 2017;17(5):738-753.    Largest cyst between 10-20 mm: CT or MRI/MRCP every 6 months for 1 year and then yearly for 2 years and then every 2 years if no change.         Narrative    EXAM: US ABDOMEN LIMITED  LOCATION: [facility]  DATE: 11/9/2023    INDICATION: gallstone, gallbladder edema  COMPARISON: CT performed same day  TECHNIQUE: Limited abdominal ultrasound.    FINDINGS:    GALLBLADDER: Gallbladder wall thickening, likely secondary to underdistention. Cholelithiasis. No pericholecystic fat stranding. Negative sonographic Charles's sign. Adenomyomatosis.    BILE DUCTS: No biliary dilatation. The common duct measures 4 mm.    LIVER: Normal parenchyma with smooth contour. No focal mass.    RIGHT KIDNEY: No hydronephrosis.    PANCREAS: 11 mm cystic lesion adjacent to the body of the pancreas.    No ascites.      Impression    IMPRESSION:  1.  Cholelithiasis without sonographic evidence of acute cholecystitis.           Labs:      Medications:   Personally Reviewed.  Medications      donepezil  5 mg Oral At Bedtime    memantine  10 mg Oral BID    [Held by provider] mirtazapine  15 mg Oral At Bedtime    OLANZapine zydis  5 mg Oral At Bedtime    pantoprazole  40 mg Oral Daily    prazosin  1 mg Oral At Bedtime    thiamine  100 mg Oral Daily

## 2023-11-10 NOTE — CONSULTS
Initial Psychiatric Consult   Consult date: November 10, 2023         Reason for Consult, requesting source:    Progressive dementia; paranoid, cries all the time, input regarding meds    Requesting source: Margie Hemphill    Labs and imaging reviewed. Notes reviewed and nursing consulted.     Total time spent in card review, patient interview and coordination of care: 60 minutes.          HPI:   Psychiatry seeing patient today regarding paranoia within the context of progressive dementia.     Per ED provider note: Patient with Alzheimer's dementia brought in by her  and son with recent hospitalization discharged on 10/25/2023 for agitation and UTI now with several day history of complaints of chest pain, abdominal pain, and significantly increased confusion as well as blood in her urine.  She is significantly hypotensive here today in the clinic.  Concerned about possible sepsis, possibly urosepsis causing increased confusion and hypotension but etiology is difficult to determine given patient's Alzheimer's and complaints of chest pain and abdominal pain as well.  I do think she needs to go over to the emergency department for further evaluation.  Discussed case with the ED physician at [facility] emergency department who agrees patient needs to be seen for higher level of care for further evaluation and treatment.       Today, patient reports feeling suspicious of others, but does not know why. She shares a story with this writer regarding a hotel that she has been staying, which does not appear to be based in reality. She denies AH/VH, but endorses poor sleep at night. Patient knows who she is, but does not know the year or the hospital she is staying. Patient denies thoughts of harming herself, but makes statements regarding others harming her. Reassurance given to patient that she is safe.             Past Psychiatric History:   History of major neurocognitive dx, early onset dementia w/mood  disturbance, panic hx, and unspecified depression         Substance Use and History:     Tobacco Use    Smoking status: Every Day    Smokeless tobacco: Not on file   Substance Use Topics    Alcohol use: Not on file           Past Medical History:   PAST MEDICAL HISTORY:   Past Medical History:   Diagnosis Date    Cervical cancer (H)     Hypertension        PAST SURGICAL HISTORY:   Past Surgical History:   Procedure Laterality Date    HYSTERECTOMY               Family History:   FAMILY HISTORY: History reviewed. No pertinent family history.        Social History:   Current lives at home with .          Physical ROS:   The 10 point Review of Systems is negative other than noted in the HPI or here.           Medications:      memantine  10 mg Oral BID    [Held by provider] mirtazapine  15 mg Oral At Bedtime    OLANZapine zydis  5 mg Oral At Bedtime    pantoprazole  40 mg Oral Daily    prazosin  1 mg Oral At Bedtime    thiamine  100 mg Oral Daily              Allergies:   No Known Allergies       Labs:     Recent Results (from the past 48 hour(s))   ECG 12-LEAD WITH MUSE ([facility])    Collection Time: 11/09/23  1:40 PM   Result Value Ref Range    Systolic Blood Pressure 162 mmHg    Diastolic Blood Pressure 76 mmHg    Ventricular Rate 78 BPM    Atrial Rate 78 BPM    ME Interval 118 ms    QRS Duration 76 ms     ms    QTc 417 ms    P Axis 62 degrees    R AXIS 51 degrees    T Axis 53 degrees    Interpretation ECG       Sinus rhythm  Normal ECG  When compared with ECG of 20-OCT-2023 05:01,  No significant change was found  Confirmed by SEE ED PROVIDER NOTE FOR, ECG INTERPRETATION ([id number]),  PRICE ALFARO ([id number]) on 11/9/2023 2:08:55 PM     Lipase    Collection Time: 11/09/23  4:37 PM   Result Value Ref Range    Lipase 39 13 - 60 U/L   N terminal pro BNP outpatient    Collection Time: 11/09/23  4:37 PM   Result Value Ref Range    N Terminal Pro BNP Outpatient 41 0 - 900 pg/mL   Lactic acid whole blood     Collection Time: 11/09/23  4:37 PM   Result Value Ref Range    Lactic Acid 0.7 0.7 - 2.0 mmol/L   CBC with platelets and differential    Collection Time: 11/09/23  4:37 PM   Result Value Ref Range    WBC Count 7.9 4.0 - 11.0 10e3/uL    RBC Count 4.62 3.80 - 5.20 10e6/uL    Hemoglobin 13.1 11.7 - 15.7 g/dL    Hematocrit 41.5 35.0 - 47.0 %    MCV 90 78 - 100 fL    MCH 28.4 26.5 - 33.0 pg    MCHC 31.6 31.5 - 36.5 g/dL    RDW 15.3 (H) 10.0 - 15.0 %    Platelet Count 185 150 - 450 10e3/uL    % Neutrophils 78 %    % Lymphocytes 15 %    % Monocytes 7 %    % Eosinophils 0 %    % Basophils 0 %    % Immature Granulocytes 0 %    NRBCs per 100 WBC 0 <1 /100    Absolute Neutrophils 6.1 1.6 - 8.3 10e3/uL    Absolute Lymphocytes 1.2 0.8 - 5.3 10e3/uL    Absolute Monocytes 0.5 0.0 - 1.3 10e3/uL    Absolute Eosinophils 0.0 0.0 - 0.7 10e3/uL    Absolute Basophils 0.0 0.0 - 0.2 10e3/uL    Absolute Immature Granulocytes 0.0 <=0.4 10e3/uL    Absolute NRBCs 0.0 10e3/uL   Erythrocyte sedimentation rate auto    Collection Time: 11/09/23  4:37 PM   Result Value Ref Range    Erythrocyte Sedimentation Rate 12 0 - 30 mm/hr   UA with Microscopic reflex to Culture    Collection Time: 11/09/23  4:53 PM    Specimen: Urine, Clean Catch   Result Value Ref Range    Color Urine Colorless Colorless, Straw, Light Yellow, Yellow    Appearance Urine Clear Clear    Glucose Urine Negative Negative mg/dL    Bilirubin Urine Negative Negative    Ketones Urine Negative Negative mg/dL    Specific Gravity Urine 1.008 1.001 - 1.030    Blood Urine Negative Negative    pH Urine 5.0 5.0 - 7.0    Protein Albumin Urine Negative Negative mg/dL    Urobilinogen Urine <2.0 <2.0 mg/dL    Nitrite Urine Negative Negative    Leukocyte Esterase Urine Negative Negative    Bacteria Urine Few (A) None Seen /HPF    Mucus Urine Present (A) None Seen /LPF    RBC Urine <1 <=2 /HPF    WBC Urine 1 <=5 /HPF    Squamous Epithelials Urine 10 (H) <=1 /HPF   Comprehensive metabolic panel     Collection Time: 11/09/23  5:18 PM   Result Value Ref Range    Sodium 142 135 - 145 mmol/L    Potassium 3.8 3.4 - 5.3 mmol/L    Carbon Dioxide (CO2) 27 22 - 29 mmol/L    Anion Gap 11 7 - 15 mmol/L    Urea Nitrogen 16.6 8.0 - 23.0 mg/dL    Creatinine 1.09 (H) 0.51 - 0.95 mg/dL    GFR Estimate 58 (L) >60 mL/min/1.73m2    Calcium 10.3 (H) 8.6 - 10.0 mg/dL    Chloride 104 98 - 107 mmol/L    Glucose 110 (H) 70 - 99 mg/dL    Alkaline Phosphatase 85 35 - 104 U/L    AST 18 0 - 45 U/L    ALT 13 0 - 50 U/L    Protein Total 7.2 6.4 - 8.3 g/dL    Albumin 4.4 3.5 - 5.2 g/dL    Bilirubin Total 0.5 <=1.2 mg/dL   Troponin T, High Sensitivity    Collection Time: 11/09/23  5:18 PM   Result Value Ref Range    Troponin T, High Sensitivity 16 (H) <=14 ng/L   Vitamin D Deficiency    Collection Time: 11/09/23  5:18 PM   Result Value Ref Range    Vitamin D, Total (25-Hydroxy) 51 (H) 20 - 50 ng/mL          Physical and Psychiatric Examination:     BP (!) 151/82   Pulse 86   Temp 97.9  F (36.6  C) (Oral)   Resp 16   Wt 88.9 kg (196 lb)   SpO2 95%   BMI 31.64 kg/m    Weight is 196 lbs 0 oz  Body mass index is 31.64 kg/m .    Physical Exam:  I have reviewed the physical exam as documented by by the medical team and agree with findings and assessment and have no additional findings to add at this time.         MSE:   Appearance: awake, alert  Attitude:  guarded  Eye Contact:  poor   Mood:  anxious  Affect:  slightly restricted  Speech:  increased speech latency and pressured speech  Psychomotor Behavior:  no evidence of tardive dyskinesia, dystonia, or tics  Muscle strength and tone: Appears slightly above average  Thought Process:  disorganized and illogical  Associations:  loosening of associations present  Thought Content:  no evidence of suicidal ideation or homicidal ideation, no evidence of psychotic thought, no auditory hallucinations present, and no visual hallucinations present  Insight:  limited  Judgement:  poor  Oriented to:   Oriented to self, but disoriented to time and location  Attention Span and Concentration:   Impaired  Recent and Remote Memory:   Impaired             DSM-5 Diagnosis:   Generalized Anxiety Disorder, by history  Major Depressive Disorder, by history  Major Neurocognitive Disorder, Alzheimer's dementia, current and by history, possible delirium          Assessment:   Patient was visibly anxious and guarded during our meeting. Reassurance was given to patient, but it was unclear if she was in a place to process this information. I met with patient during her previous hospitalization where she had a similar presentation. At that time, she had difficulty sleeping, due to increased paranoia and hallucinations at night. It appears her sleep continues to be of concern, so it is reasonable to continue Zyprexa at HS.  It is not uncommon for a patient diagnosed with Alzheimer's dementia to be fearful and display increased paranoia when in an new environment. Although we typically try to avoid antipsychotics in managing behavioral concerns in Alzheimer's dementia, patient's hypervigilance and fearfulness of staff might indicate the acute use of antipsychotics for patient safety. I don't believe she would benefit from an inpatient psychiatry setting at this time, as this would not change her symptoms associated with Alzheimer's dementia.             Summary of Recommendations:   Continue Zyprexa 2.5-5 mg TID PRN and 5 mg at HS for anxiety associated with paranoia and sleep.    Added donepezil (Aricept) 5 mg at HS, which will help enhance the effects of PTA memantine for Alzheimer's dementia.     Delirium precautions:  Up during the day with lights on  Lights off at night, avoid interruptions during the night as much as possible  Family visits  Encourage wearing glasses  Reorientation  Avoid opioids, benzodiazepines, anticholinergics.    Continue to ensure proper nutrition, fluid and electrolyte balance. Monitor for infections,  "hypoxia, metabolic derangements, or other causes of delirium.       It is increasingly recognised that pharmacological treatments for dementia should be used as a second-line approach and that non-pharmacological options should, in best practice, be pursued first (ARIEL Schultz, JANAE Hood, & AUGUSTUS Riley (2004). Non-pharmacological interventions in dementia. Advances in Psychiatric Treatment, 10(3), 171-177. Doi:10.1192/apt.10.3.171)    Non-pharmacological approaches are preferable to reduce responsive behaviours, improve/maintain functional capacity and reduce emotional disorders (Mona Carson 2018).     Please see below.     DEMENTIA INTERVENTIONS        Non-pharmacological interventions include but are not limited to:   Lavender   Warm Blankets and/or weighted blankets   Activities of interest currently or in the past   Calming music   5,4,3,2,1    Grounding exercise: 5 things you see, 4 things you feel, 3 things you hear, 2 things you smell, 1 thing you taste    Dementia Basics  Use a consistent positive physical approach  - gesture & greet by name   - offer your hand & make eye contact   - approach slowly within visual range   - shake hands & maintain hand-under-hand   - move to the side of the patient  - get to eye level & respect intimate space   - wait for acknowledgement     How you help     Sight or Visual cues    Verbal or Auditory cues    Touch or Tactile cues     USE VISUAL combined VERBAL (gesture/point)   -  It s about time for     -  Let s go this way     -  Here are your socks      DON T ask questions you DON T want to hear the answer to  ( Do you want to  ,  Are you ready to  , \"I need you to  )    Acknowledge the response/reaction to your info     USE THEIR WORDS (with a ? OR in agreement)    LIMIT your words - Keep it SIMPLE    WAIT!!!!      ID common interest    Say something nice about the person or their place    Share something about yourself and encourage the person to share back    Follow " their lead - listen actively    Use some of their words to keep the flow going    Remember its the FIRST TIME!    Do s    Go with the FLOW    Use SUPPORTIVE communication techniques   - Use objects and the environment   - Give examples   - Use gestures and pointing   - Acknowledge & accept emotions   - Use empathy & Validation   - Use familiar phrases or known interests   - Respect  values  and  beliefs  - avoid the negative     DON Ts    Try to CONTROL the FLOW   - Give up reality orientation and BIG lies   - Do not correct errors   - Offer info if asked, monitoring the emotional state    Try to STOP the FLOW   - Don t reject topics   - Don t try to distract UNTIL you are well connected   - Keep VISUAL cues positive       A Positive Physical Approach for Someone with Dementia   1. Knock on door or table - to get attention if the person is not looking at you & get permission to enter or approach     2. Open palm near face and smile - look friendly and give the person a visual cue make eye contact     3. Call the person by name OR at least say  Hi!      4. Move your hand out from an open hand near face to a greeting handshake position     5. Approach the person from the front - notice their reaction to your outstretched hand - start approaching or let the person come to you, if s/he likes to be in control     6. Move slowly - one step/second, stand tall, don t crouch down or lean in as you move toward the person     7. Move toward the right side of the person and offer your hand - give the person time to look at your hand and reach for it, if s/he is doing something else - offer, don t force     8. Stand to the side of the person at arm s length - respect personal space & be supportive not confrontational     9. Shake hands with the person - make eye contact while shaking     10. Slide your hand from a  shake  position to hand-under-hand position - for safety,     connection, and function     11. Give your name &  greet -  I m (name). It s good to see you!      12. Get to the person s level to talk - sit, squat, or kneel if the person is seated and stand beside the person if s/he is standing     13. NOW, deliver your message        Approaching When The Person is distressed:    TWO CHANGES -   1. Look concerned not too happy, if the person is upset     2. Let the person move toward you, keeping your body turned  sideways(supportive - not confrontational)     3. After greeting  try one of two options    a.  Sounds like you are (give an emotion or feeling that seems to be true)???    b. Repeat the person s words to you  If s/he said,  Where s my mom?  you   would say  You re looking for your mom (pause)  tell me about your mom     If the person said  I want to go home! , you would say  You want to go home (pause)  Tell me about your home  .     BASIC CARD CUES - WITH Dementia   ? -Knock - Announce self   ? -Greet & Smile   ? -Move Slowly - Hand offered in  handshake  position   ? -Move from the front to the side   ? -Greet with a handshake & your name        -Slide into hand-under-hand hold         -Get to the person s level   ? -Be friendly -make a  nice  comment or smile         -Give your message  simple, short, friendly     First -     ALWAYS use the positive physical approach!     Then -     Pay attention to the THREE ways you communicate     1 .  How you speak   - Tone of voice (friendly not bossy or critical)   - Pitch of voice (deep is better)   - Speed of speech ( slow and easy not pressured or fast)     2 .  What you say   THREE basic reasons to talk to someone   1. To get the person to DO something (5   approaches to try)   1. give a short, direct message about what is happening   2. give simple choices about what the person can do   3. ask the person to help you do something   4. ask if the person will give it a try   5.  break down the task - give it one step at a time     ** only ask  Are you ready to   If you are  "willing to come back later **     2.  Just to have a friendly interaction - to talk to the person    go slow - Go with Flow   2.  acknowledge emotions - \"sounds like , seems like , I can see you are \"   ?  3. use familiar words or phrases (what the person uses)   4. know who the person has been as a person what s/he values   ?  5. use familiar objects, pictures, actions to help & direct   ?   6.be prepared to have the same conversation over & over   ?   7. look interested & friendly   8.be prepared for some emotional outbursts   9.DON'T argue  - BUT don't let the person get into dangerous situations     **REMEMBER - the person is doing the BEST that s/he can**    3. Deal with the person's distress or frustration/anger   1.Try to figure out what the person really NEEDS or WANTS (\"It sounds like \" \"It looks like \" \"It seems like \" \"You're feeling \")   2.Use empathy not forced reality or lying   3.Once the person is listening and responding to you THEN -   4.Redirect his attention and actions to something that is OK OR   5.Distract him with other things or activities you know he likes & values     **Always BE CAREFUL about personal space and touch with the person especially when s/he is distressed or being forceful **    4.  How you respond to the person   1.use positive, friendly approval or praise (short, specific and sincere)   2.offer your thanks and appreciation for his/her efforts   3.laugh with him/her & appreciate attempts at humor & friendliness   4.  shake hands to start and end an interaction    5.  use touch - hugging, hand holding, comforting only IF the person wants it     If what you are doing is NOT working -     ? STOP!     ? BACK OFF - give the person some space and time     ? Decide on what to do differently     ? Try Again!       Key Points About 'Who' the person Is .   - preferred name   - introvert or extrovert   - a planner or a doer   - a follower or a leader   - a 'detail' or a 'big picture' " person   - work history - favorite and most hated jobs or parts of jobs   - family relationships and history - feelings about   -various family members   - social history - memberships and relationships to friends and groups   - leisure background - favorite activities & beliefs about fun, games, & free time   - previous daily routines and schedules   - personal care habits and preferences   - Confucianist and spiritual needs and beliefs   - values and interests   - favorite topics, foods, places   - favorite music and songs - dislike of music or songs   - hot buttons & stressors   - behavior under stress   - what things help with stress?   - handedness   - level of cognitive impairment   - types of help that are useful       Communication - When Words Don t Work Anymore      Keys to Success:   -Watch movements & actions   -Watch facial expressions and eye movements   -Listen for changes in volume, frequency, and intensity of sounds or words   -Investigate & Check it out   -Meet the need     It s all about Meeting Needs    -Physical needs   -Emotional needs   -Probable Needs:   -Physical  ?  *Tired   *In pain or uncomfortable   *Thirsty or Hungry   *Need to pee or have a BM or already did & need help   *Too hot or too cold     -Emotional   *Afraid   *Lonely   *Bored   *Angry   *Excited     What Can You Do?   -Figure it out Go thru the list   -Meet the need  Offer help that matches need   -Use visual cues more than verbal cues   -Use touch only after  permission  is given   -Connect - Visually, Verbally, Tactilely   -Protect Yourself & the Person - use Hand Under Hand & Supportive Stance techniques   -Reflect - copy expression/tone, repeat some key words, move with the person   -Engage - LISTEN with your head, your heart, and your body  -Respond - try to meet the unmet needs, offer comfort and connection     ** IF IT DOESN T seem to be working - STOP, BACK OFF - and then TRY AGAIN - changing something in your efforts  "(visually, verbally, or through touch/physical contact)**     Types of Help - Using Your Senses   Visual   -Written Information - Schedules and Notes   -Key Word Signs - locators & identifiers   -Objects in View - familiar items to stimulate task performance   -Gestures - pointing and movements   -Demonstration - provide someone to imitate     2.  Auditory  -Talking and Telling - give information, ask questions, provide choices   -Breaking it Down - Step-by-Step Task Instructions   -Using Simple Words and Phrases - Verbal Cues   -Name Calling - Auditory Attention   -Positive Feedback - praise, \"yes\", encouragement     3.Tactile - Touch   -Greeting & Comforting - handshakes, hugs, 'hand-holding'   -Touch for Attention during tasks  - Tactile Guidance - lead through 'once' to get the feel   -Hand-Under-Hand Guidance - palm to palm contact   -Hand-Under-Hand Assistance - physical help   -Dependent Care - doing for & to the person       Page me or re-consult psychiatry as needed.       Yue Ordaz, SOBEIDAP, APRN  Consult/Liaison Psychiatry  [facility]   Contact information available via [facility] Paging/Directory.  If I am not available, please call [facility] intake ([phone])              "

## 2023-11-10 NOTE — PLAN OF CARE
PRIMARY DIAGNOSIS: Dementia  OUTPATIENT/OBSERVATION GOALS TO BE MET BEFORE DISCHARGE:  ADLs back to baseline: No    Activity and level of assistance: Up with standby assistance.    Pain status: Pain free.    Return to near baseline physical activity: Yes     Discharge Planner Nurse   Safe discharge environment identified: No  Barriers to discharge: Yes       Entered by: Rachael Cortez RN 11/10/2023 4:30 PM     Please review provider order for any additional goals.   Nurse to notify provider when observation goals have been met and patient is ready for discharge.

## 2023-11-10 NOTE — PLAN OF CARE
"PRIMARY DIAGNOSIS: \"GENERIC\" NURSING  OUTPATIENT/OBSERVATION GOALS TO BE MET BEFORE DISCHARGE:  ADLs back to baseline: No    Activity and level of assistance: Up with standby assistance.    Pain status: Improved-controlled with oral pain medications.    Return to near baseline physical activity: No     Discharge Planner Nurse   Safe discharge environment identified: No  Barriers to discharge: Yes       Entered by: Josefina Skaggs RN 11/10/2023 5:51 AM    Pt required another PRD dose of ativan this morning, pt was attempting to get out of bed and appeared restless and agitated. No other changes overnight.     "

## 2023-11-10 NOTE — PHARMACY-ADMISSION MEDICATION HISTORY
Pharmacist Admission Medication History    Admission medication history is complete. The information provided in this note is only as accurate as the sources available at the time of the update.    Information Source(s): Patient and CareEverywhere/SureScripts via in-person    Pertinent Information:     Changes made to PTA medication list:  Added: olanzapine  Deleted: quetiapine  Changed: Vitamin D         Allergies reviewed with patient and updates made in EHR: no    Medication History Completed By: Nani Bolaños RPH 11/9/2023 8:16 PM    PTA Med List   Medication Sig Last Dose    aspirin 81 MG EC tablet Take 81 mg by mouth daily 11/9/2023 at AM    cholecalciferol 50 MCG (2000 UT) tablet Take 1 capsule by mouth daily 11/9/2023 at AM    cyanocobalamin (VITAMIN B-12) 500 MCG tablet Take 500 mcg by mouth daily 11/9/2023 at AM    melatonin 3 MG tablet Take 1 tablet (3 mg) by mouth daily (with dinner) 11/8/2023 at PM    memantine (NAMENDA) 10 MG tablet Take 10 mg by mouth 2 times daily 11/9/2023 at AM    mirtazapine (REMERON) 15 MG tablet Take 15 mg by mouth At Bedtime 11/8/2023 at PM    Multiple Vitamins-Minerals (MULTIVITAL PO) Take 1 tablet by mouth daily 11/9/2023 at AM    OLANZapine (ZYPREXA) 5 MG tablet Take 5 mg by mouth at bedtime 11/8/2023 at PM    OLANZapine (ZYPREXA) 5 MG tablet Take 2.5 mg by mouth daily as needed Unknown    omeprazole (PRILOSEC) 20 MG DR capsule Take 20 mg by mouth daily before breakfast 11/9/2023 at AM    prazosin (MINIPRESS) 1 MG capsule Take 1 capsule (1 mg) by mouth at bedtime 11/8/2023 at PM    thiamine (B-1) 100 MG tablet Take 1 tablet (100 mg) by mouth daily 11/9/2023 at AM

## 2023-11-11 NOTE — PROGRESS NOTES
CM spoke with patient's  Jose via phone ([phone]). Discussed discharge planning and community resources. Jose states his son Martinez is the person who is looking into different options for patient care at home. They cannot afford adult day care programs and they are looking into memory care facilities. Memory care facilities have long waiting-lists and they may not be able to afford them either.     Jose states the family is meeting on 11/13/23 with insurance person at their [facility] to discuss the possibility that they will provide a PCA for up to 40 hours per week. Jose requested that we change patient's preferred pharmacy to [facility] in [place] because [facility] and they close on weekends. This CM asked ED Pharmacy to make this change in their notes. Jose asked this writer to call son Martinez because Jose was at work.    This writer contacted son Martinez ([phone]). Martinez reiterated above information. Stated they have the resourced provided by CM on 10/25/23. Martinez has been in contact with a few private pay agencies and is waiting for quotes from them. He is also speaking with representatives at the [employer] to see what other resources they can direct family to. Martinez has concern regarding patient's kidney function due to medications. They have a scheduled appointment for this coming week with patient's primary care provider and they will also inquire about effects of medications on kidney function.     Both Jose and Martinez are very appreciative of all the time spent by CMs listening, answering questions, and providing resources for a situation that they acknowledge is very difficult to navigate. They expect that patient will be discharged to home on 11/11/23 and they will provide transportation.      ARCELIA BOLDEN RN/KEVIN

## 2023-11-11 NOTE — PROGRESS NOTES
[facility] MEDICINE PROGRESS NOTE      Summary:  57 year old female into [facility] after presenting to the ER on 11/9/2023 for increasing agitation.  Pts baseline includes significant  Deficits due to ongoing alzheimers dementia.    Diagnostics in the ER were reassuring.  She has no blood test abnormalities  To explain her exacerbation.  There was a gallstone on CT that  Showed stability on a follow up ultrasound.      On my interview today she is the same as yesterday; tearful, paranoid. She  Requires a sitter in the room.    Pts  is tired.  He is often up at night.  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    Yesterday she was put on aricept to augment the memantine.  The  Zyprexa dose is small; not enough to contribute to kidney issues.    Overnight things are about the same.  On my arrival the   Is standing out in the porras frustrated as he triggers her.      The clinical goal is for safe discharge planning.  The   Wants her at home though she becomes frequently paranoid  And anxious when she sees him.  She continues to need a 1:1  Sitter.         Hospital day number 2    Problem list:     Dementia, alzheimers; progressive: psych input appreciated;   Continue aricept, memantine, zyprexa  Agitation due to number 1: discharge planning pending  History of UTI: positive culture on 10/20 with  Pansensitive e coli; treated with keflex; pts urinalysis  On admission without strong signal for infection; not cultured  4.  History of partial right nephrectomy: creatinine stable;   5.  Gallstones: stable, nonacute  6.  Disposition:  pt will stay today for medication management   And safe discharge planning      Margie Hemphill MD  [facility] Medicine  [facility]  Phone: #[phone]  Securely message with the Vocera Web Console (learn more here)  Text page via [employer]  Paging/Directory     Interval History/Subjective:  watching TV; per report tearful still   This morning      Physical Exam/Objective:  Temp:  [97.4  F (36.3  C)-97.7  F (36.5  C)] 97.7  F (36.5  C)  Pulse:  [78-92] 84  Resp:  [16-18] 18  BP: (144-159)/(82-86) 144/86  SpO2:  [94 %-96 %] 95 %  Body mass index is 31.64 kg/m .    GENERAL:  Alert, looking around; watching TV; says oh the snow stopped   HEAD:  Normocephalic, without obvious abnormality, atraumatic   EYES:  PERRL, conjunctiva/corneas clear, no scleral icterus, EOM's intact   NOSE: Nares normal, septum midline, mucosa normal, no drainage   THROAT: Lips, mucosa, and tongue normal; teeth and gums normal, mouth moist   NECK: Supple, symmetrical, trachea midline   BACK:   Symmetric, no curvature, ROM normal   LUNGS:   Clear to auscultation bilaterally, no rales, rhonchi, or wheezing, symmetric chest rise on inhalation, respirations unlabored   CHEST WALL:  No tenderness or deformity   HEART:  Regular rate and rhythm, S1 and S2 normal, no murmur, rub, or gallop    ABDOMEN:   Soft, non-tender, bowel sounds active all four quadrants, no masses, no organomegaly, no rebound or guarding   EXTREMITIES: Extremities normal, atraumatic, no cyanosis or edema    SKIN: Dry to touch, no exanthems in the visualized areas   NEURO: Alert, oriented x3, moves all four extremities freely   PSYCH: Cooperative, behavior is appropriate      Data reviewed today: I personally reviewed all new medications, labs, imaging/diagnostics reports over the past 24 hours. Pertinent findings include:    Imaging:   No results found for this or any previous visit (from the past 24 hour(s)).      Labs:      Medications:   Personally Reviewed.  Medications      donepezil  5 mg Oral At Bedtime    memantine  10 mg Oral BID    [Held by provider] mirtazapine  15 mg Oral At Bedtime    OLANZapine zydis  5 mg Oral At Bedtime    pantoprazole  40 mg Oral Daily    prazosin  1 mg Oral At Bedtime    thiamine   100 mg Oral Daily

## 2023-11-11 NOTE — PROGRESS NOTES
Hospitalist follow up:    Report received from RN on 3S that the patient  Is frustrated, throwing things and not redirecting.  She has difficult to treat dementia that is progressive.  She responds only a little to 5 mg of zyprexa.    Geodon 10 mg IM once ordered; will further review  Medication options tomorrow with pharmacy.    INES Hemphill.  HMS

## 2023-11-11 NOTE — PLAN OF CARE
PRIMARY DIAGNOSIS: Dementia  OUTPATIENT/OBSERVATION GOALS TO BE MET BEFORE DISCHARGE:  ADLs back to baseline: No    Activity and level of assistance: Up with standby assistance.    Pain status: Pain free.    Return to near baseline physical activity: Yes     Discharge Planner Nurse   Safe discharge environment identified: No  Barriers to discharge: Yes, patient requiring Zyprexa for restlessness         Entered by: Carlita Wilson RN 11/11/2023 3:50 AM

## 2023-11-11 NOTE — PLAN OF CARE
PRIMARY DIAGNOSIS: Dementia  OUTPATIENT/OBSERVATION GOALS TO BE MET BEFORE DISCHARGE:  ADLs back to baseline: No    Activity and level of assistance: Up with standby assistance.    Pain status: Pain free.    Return to near baseline physical activity: Yes     Discharge Planner Nurse   Safe discharge environment identified: No  Barriers to discharge: Yes, patient required prn Zyprexa for restlessness, attempting to get out of bed.       Entered by: Carlita Wilson RN 11/11/2023 12:01 AM

## 2023-11-11 NOTE — PLAN OF CARE
Problem: Dementia Signs/Symptoms  Goal: Improved Behavioral Control (Dementia Signs/Symptoms)  Outcome: Progressing  Intervention: Manage Behavior  Flowsheets (Taken 11/11/2023 1066)  Environmental Support:   distractions minimized   comfort object encouraged   rest periods encouraged   calm environment promoted   Goal Outcome Evaluation:    Pt A/Ox1-2. 1:1 sitter continued for safety. Pt had a few episodes of increased agitation and paranoia/anxiety; Zyprexa and lorazepam given per order to good effect. VSS. Report called to 3S, pt transferred to room 350.  (Jose) updated over telephone.

## 2023-11-12 NOTE — PLAN OF CARE
Problem: Dementia Signs/Symptoms  Goal: Optimized Oral Intake (Dementia Signs/Symptoms)  11/11/2023 2237 by Lyubov Pastor RN  Outcome: Not Progressing  11/11/2023 2006 by Lyubov Pastor RN  Outcome: Not Progressing  Goal: Enhanced Social or Functional Skills and Ability (Dementia Signs/Symptoms)  Outcome: Not Progressing  Intervention: Promote Social and Functional Ability  Recent Flowsheet Documentation  Taken 11/11/2023 1826 by Lyubov Pastor RN  Social Functional Ability Promotion:   social interaction promoted   opportunity for activity provided     Problem: Dementia Signs/Symptoms  Goal: Improved Behavioral Control (Dementia Signs/Symptoms)  11/11/2023 2237 by Lyubov Pastor RN  Outcome: Progressing  11/11/2023 2006 by Lyubov Pastor RN  Outcome: Not Progressing  Intervention: Manage Behavior  Recent Flowsheet Documentation  Taken 11/11/2023 1826 by Lyubov Pastor RN  Environmental Support:   distractions minimized   comfort object encouraged   rest periods encouraged   calm environment promoted  Taken 11/11/2023 1620 by Lyubov Pastor RN  Environmental Support:   calm environment promoted   distractions minimized  Goal: Improved Mood Symptoms (Dementia Signs/Symptoms)  11/11/2023 2237 by Lyubov Pastor RN  Outcome: Progressing  11/11/2023 2006 by Lyubov Pastor RN  Outcome: Not Progressing  Intervention: Optimize Emotion and Mood  Recent Flowsheet Documentation  Taken 11/11/2023 1826 by Lyubov Pastor RN  Supportive Measures: active listening utilized  Goal: Optimized Cognitive Function (Dementia Signs/Symptoms)  11/11/2023 2237 by Lyubov Pastor RN  Outcome: Progressing  11/11/2023 2006 by Lyubov Pastor RN  Outcome: Not Progressing  Goal: Improved Sleep (Dementia Signs/Symptoms)  11/11/2023 2237 by Lyubov Pastor RN  Outcome: Progressing  11/11/2023 2006 by Lyubov Pastor RN  Outcome: Not Progressing    Patient finally settled down this evening after a dose of IV ativan. Pt no  longer aggressive and verbally upset. Sitter at bedside. Bedtime meds rescheduled to allow for patient to rest.    Regular diet  AxO intermittent

## 2023-11-12 NOTE — PROGRESS NOTES
11/12/23 1100   Appointment Info   Signing Clinician's Name / Credentials (PT) Martinez Lock, PT, DPT   Quick Adds   Quick Adds Certification   Living Environment   People in Home spouse   Current Living Arrangements house   Home Accessibility stairs to enter home;stairs within home   Number of Stairs, Main Entrance 2   Number of Stairs, Within Home, Primary eight   Self-Care   Usual Activity Tolerance good   Current Activity Tolerance moderate   Equipment Currently Used at Home none   Fall history within last six months no   General Information   Onset of Illness/Injury or Date of Surgery 11/09/23   Referring Physician Dr. Vanegas   Patient/Family Therapy Goals Statement (PT) Go home   Pertinent History of Current Problem (include personal factors and/or comorbidities that impact the POC) Dementia w/ behavioral disturbance   Existing Precautions/Restrictions fall   Weight-Bearing Status - LLE full weight-bearing   Weight-Bearing Status - RLE full weight-bearing   Range of Motion (ROM)   ROM Comment WFL   Strength (Manual Muscle Testing)   Strength Comments WFL   Transfers   Transfers sit-stand transfer   Sit-Stand Transfer   Sit-Stand [place] (Transfers) contact guard   Gait/Stairs (Locomotion)   [place] Level (Gait) contact guard   Distance in Feet (Gait) 10'   Pattern (Gait) step-through   Deviations/Abnormal Patterns (Gait) maria decreased;stride length decreased   Clinical Impression   Criteria for Skilled Therapeutic Intervention Yes, treatment indicated   PT Diagnosis (PT) Impaired functional mobility   Influenced by the following impairments Weakness   Functional limitations due to impairments Transfers, gait, stairs   Clinical Presentation (PT Evaluation Complexity) stable   Clinical Presentation Rationale Pt presents as medically diagnosed   Clinical Decision Making (Complexity) moderate complexity   Planned Therapy Interventions (PT) gait training;stair training;strengthening;transfer training    Risk & Benefits of therapy have been explained care plan/treatment goals reviewed;patient   PT Total Evaluation Time   PT Eval, Moderate Complexity Minutes (64751) 10   Therapy Certification   Start of care date 11/12/23   Certification date from 11/12/23   Certification date to 12/12/23   Medical Diagnosis Dementia   Physical Therapy Goals   PT Frequency One time eval and treatment only   PT Predicted Duration/Target Date for Goal Attainment 11/12/23   PT Goals Transfers;Gait   PT: Transfers Supervision/stand-by assist;Sit to/from stand;Goal Met   PT: Gait Supervision/stand-by assist;100 feet;Goal Met   Interventions   Interventions Quick Adds Gait Training;Therapeutic Activity   Therapeutic Activity   Therapeutic Activities: dynamic activities to improve functional performance Minutes (15819) 5   Symptoms Noted During/After Treatment None   Treatment Detail/Skilled Intervention Sitting down CGA, SBA at EOB, sit<>stand again CGA, increased time for education, active listening and encouraging pt about POC, role of therapies.   Gait Training   Gait Training Minutes (38381) 8   Symptoms Noted During/After Treatment (Gait Training) fatigue;increased pain   Treatment Detail/Skilled Intervention Pt amb in halls with NA when handed off to PT, CGA for amb back to room, no LOB or adverse s/s other than increased LBP. Pt tearful at times, wanting to go home. Encourage mobility with nursing which pt has been able to do and use of AD if needed.   Distance in Feet 100'   [place] Level (Gait Training) contact guard   Physical Assistance Level (Gait Training) supervision;verbal cues   Weight Bearing (Gait Training) full weight-bearing   Pattern Analysis (Gait Training) swing-through gait   Gait Analysis Deviations decreased maria;decreased step length   Impairments (Gait Analysis/Training) strength decreased   PT Discharge Planning   PT Plan D/C PT   PT Discharge Recommendation (DC Rec) (S)  home with assist   PT  Rationale for DC Rec Family to assist as they have been, LTC candidate due to current cognitive concerns, ambulating and transferring adequately for home.   PT Brief overview of current status CGA for mobility, amb 350' with no AD   [facility]  OUTPATIENT PHYSICAL THERAPY EVALUATION  PLAN OF TREATMENT FOR OUTPATIENT REHABILITATION  (COMPLETE FOR INITIAL CLAIMS ONLY)  Patient's Last Name, First Name, M.I.  YOB: 1965  Aide Brooks                        Provider's Name  [facility] Medical Record No.  [id number]                             Onset Date:  11/09/23   Start of Care Date:  11/12/23   Type:     _X_PT   ___OT   ___SLP Medical Diagnosis:  Dementia              PT Diagnosis:  Impaired functional mobility Visits from SOC:  1     See note for plan of treatment, functional goals and certification details    I CERTIFY THE NEED FOR THESE SERVICES FURNISHED UNDER        THIS PLAN OF TREATMENT AND WHILE UNDER MY CARE     (Physician co-signature of this document indicates review and certification of the therapy plan).

## 2023-11-12 NOTE — PROGRESS NOTES
"[facility] Medicine PROGRESS NOTE      Identification/Summary:    57 year old female [facility] after presenting to the ER on 11/9/2023 for increasing agitation.  Pts baseline includes significant  Deficits due to ongoing alzheimers dementia.    Assessment and Plan:   Dementia, alzheimers; progressive: psych input appreciated;   Continue aricept, memantine, zyprexa  She is very tearful and appears depressed, start Zoloft.  One-to-one sitter at bedside.  She will need follow-up with psychiatry, neurology as outpatient.  Agitation due to number 1: discharge planning pending  History of UTI: positive culture on 10/20 with  Pansensitive e coli; treated with keflex; pts urinalysis  On admission without strong signal for infection; not cultured  4.  History of partial right nephrectomy: creatinine stable;   5.  Gallstones: stable, nonacute  6.  Disposition: To discuss with , social work to see.    Diet: Regular Diet Adult  Diet  DVT Prophylaxis:     Code Status: Full Code    Clinically Significant Risk Factors Present on Admission                # Drug Induced Platelet Defect: home medication list includes an antiplatelet medication   # Hypertension: Noted on problem list   # Dementia: noted on problem list        # Financial/Environmental Concerns:              Anticipated possible discharge in 1 days once safe discharge plan milestones are met.    Interval History/Subjective:  Behavioral issues ongoing, with agitation this morning.   did not feel comfortable taking her home.  One-to-one sitter at bedside.    Physical Exam/Objective:  Vitals I/O   Vital signs:  Temp: 98.2  F (36.8  C) Temp src: Oral BP: 132/78 Pulse: 94   Resp: 18 SpO2: 96 % O2 Device: None (Room air)     Weight: 88.9 kg (196 lb)  Estimated body mass index is 31.64 kg/m  as calculated from the following:    Height as of 8/7/23: 1.676 m (5' 6\").    Weight as of this encounter: 88.9 kg (196 lb).      I/O last 3 completed shifts:  In: " 360 [P.O.:360]  Out: -      Body mass index is 31.64 kg/m .    General Appearance:  Alert, cooperative, no distress   Head:  Normocephalic, atraumatic   Eyes:  PERRL    Throat:  mucosa; moist   Neck: No JVD, thyromegaly   Lungs:   Clear to auscultation bilaterally, respirations unlabored   Chest Wall:  No tenderness or deformity   Heart:  Regular rate and rhythm, S1, S2 normal,no murmur   Abdomen:   Soft, non tender, non distended, bowel sounds present, no guarding or rigidity   Extremities: No edema, no joint swelling   Skin: Skin color, texture, turgor normal, no rashes or lesions   Neurologic: Alert and oriented to person, place moves all 4 extremities       Medications:   Personally Reviewed.   donepezil  5 mg Oral At Bedtime    memantine  10 mg Oral BID    OLANZapine  5 mg Oral At Bedtime    OLANZapine zydis  5 mg Oral BID    pantoprazole  40 mg Oral Daily    prazosin  1 mg Oral At Bedtime    thiamine  100 mg Oral Daily       Data reviewed today: I personally reviewed all new medications, labs, imaging/diagnostics reports over the past 24 hours. Pertinent findings include.     Labs:  Most Recent 3 CBC's:  Recent Labs   Lab Test 11/09/23  1637 10/21/23  1117 10/20/23  0532   WBC 7.9 6.3 7.9   HGB 13.1 12.7 13.1   MCV 90 91 90    202 187     Most Recent 3 BMP's:  Recent Labs   Lab Test 11/12/23  0705 11/11/23  1503 11/09/23  1718 10/23/23  0806 10/23/23  0557 10/21/23  1117   NA  --   --  142  --  144 142   POTASSIUM  --   --  3.8  --  3.9 4.1   CHLORIDE  --   --  104  --  107 103   CO2  --   --  27  --  28 29   BUN  --   --  16.6  --  15.3 16.0   CR 1.00* 1.09* 1.09*  --  1.08* 1.14*   ANIONGAP  --   --  11  --  9 10   JOLLY  --   --  10.3*  --  9.1 9.5   GLC  --   --  110* 128* 93 101*     Most Recent 2 LFT's:  Recent Labs   Lab Test 11/09/23  1718 10/20/23  0441   AST 18 36   ALT 13 16   ALKPHOS 85 88   BILITOTAL 0.5 0.4       Imaging:   No results found for this or any previous visit (from the past 24  hour(s)).         > 50 MINUTES SPENT BY ME on the date of service doing chart review, history, exam, documentation & further activities per the note.    Ila Vanegas MD  Hospitalist  [facility]

## 2023-11-12 NOTE — PROGRESS NOTES
"PRIMARY DIAGNOSIS: \"GENERIC\" NURSING  OUTPATIENT/OBSERVATION GOALS TO BE MET BEFORE DISCHARGE:  ADLs back to baseline: No    Activity and level of assistance: Up with standby assistance.    Pain status: Pain free.    Return to near baseline physical activity: No     Discharge Planner Nurse   Safe discharge environment identified: No  Barriers to discharge: Yes       Entered by: Isabela Naranjo RN 11/12/2023 5:44 AM  VSS on RA. Mood behavior labile. Alert to self only. Ativan given x1. Sitter at bedside No other acute changes on shift.   Please review provider order for any additional goals.   Nurse to notify provider when observation goals have been met and patient is ready for discharge.  "

## 2023-11-12 NOTE — PROGRESS NOTES
Patient confused, oriented to self, and agitated. VSS on RA. Pain is managed with hot packs. PRN ativan given for agitation, was effective. Patient ambulates SBA. PIV is patent and saline locked. Tolerating oral intake and voiding appropriately. Patient resting in bed. Patient is safe, will continue to monitor.    Pt paranoid with 1:1 over lunch, threw a bag of chips at staff and threatened with a metal fork. Safe trays requested from nutrition moving forward.    Tiffany Foster RN

## 2023-11-12 NOTE — PROGRESS NOTES
"PRIMARY DIAGNOSIS: \"GENERIC\" NURSING  OUTPATIENT/OBSERVATION GOALS TO BE MET BEFORE DISCHARGE:  ADLs back to baseline: Yes    Activity and level of assistance: Up with standby assistance.    Pain status: Pain free.    Return to near baseline physical activity: Yes     Discharge Planner Nurse   Safe discharge environment identified: Yes  Barriers to discharge: Yes       Entered by: Tiffany Foster RN 11/12/2023 3:43 PM     Please review provider order for any additional goals.   Nurse to notify provider when observation goals have been met and patient is ready for discharge.  "

## 2023-11-12 NOTE — UTILIZATION REVIEW
Admission Status; Secondary Review Determination     Under the authority of the Utilization Management Committee, the utilization review process indicated a secondary review on the above patient.  The review outcome is based on review of the medical records, discussions with staff, and applying clinical experience noted on the date of the review.       (x) Observation Status Appropriate      RATIONALE FOR DETERMINATION/SUMMARY:  58 y/o female presented with worsening mentation superimposed on chronic dementia.  No major infection or other clear acute etiology.  Medication adjustments for depression being made.      The severity of illness, intensity of service provided, expected LOS and risk for adverse outcome make the care appropriate for observation.     The information on this document is developed by the utilization review team in order for the business office to ensure compliance.  This only denotes the appropriateness of proper admission status and does not reflect the quality of care rendered.       The definitions of Inpatient Status and Observation Status used in making the determination above are those provided in the CMS Coverage Manual, Chapter 1 and Chapter 6, section 70.4.     Sincerely,    Victor Manuel aGn DO, [facility]  Utilization Review  Physician Advisor

## 2023-11-12 NOTE — PROGRESS NOTES
"PRIMARY DIAGNOSIS: \"GENERIC\" NURSING  OUTPATIENT/OBSERVATION GOALS TO BE MET BEFORE DISCHARGE:  ADLs back to baseline: No    Activity and level of assistance: Up with standby assistance.    Pain status: Pain free.    Return to near baseline physical activity: No     Discharge Planner Nurse   Safe discharge environment identified: No  Barriers to discharge: Yes       Entered by: Isabela Naranjo RN 11/12/2023 6:36 AM  VSS on RA. Mood behavior labile. Alert to self only. Ativan given x1. Sitter at bedside No other acute changes on shift.   Please review provider order for any additional goals.   Nurse to notify provider when observation goals have been met and patient is ready for discharge.  "

## 2023-11-12 NOTE — PLAN OF CARE
Problem: Dementia Signs/Symptoms  Goal: Improved Behavioral Control (Dementia Signs/Symptoms)  Outcome: Not Progressing  Intervention: Manage Behavior  Recent Flowsheet Documentation  Taken 11/11/2023 1826 by Lyubov Pastor RN  Environmental Support:   distractions minimized   comfort object encouraged   rest periods encouraged   calm environment promoted  Taken 11/11/2023 1620 by Lyubov Pastor RN  Environmental Support:   calm environment promoted   distractions minimized  Goal: Improved Mood Symptoms (Dementia Signs/Symptoms)  Outcome: Not Progressing  Intervention: Optimize Emotion and Mood  Recent Flowsheet Documentation  Taken 11/11/2023 1826 by Lyubov Pastor RN  Supportive Measures: active listening utilized  Goal: Optimized Cognitive Function (Dementia Signs/Symptoms)  Outcome: Not Progressing  Goal: Optimized Oral Intake (Dementia Signs/Symptoms)  Outcome: Not Progressing  Goal: Improved Sleep (Dementia Signs/Symptoms)  Outcome: Not Progressing  Goal: Enhanced Social or Functional Skills and Ability (Dementia Signs/Symptoms)  Outcome: Not Progressing  Intervention: Promote Social and Functional Ability  Recent Flowsheet Documentation  Taken 11/11/2023 1826 by Lyubov Pastor RN  Social Functional Ability Promotion:   social interaction promoted   opportunity for activity provided    Patient is consistently agitate despite interventions and redirection. Constantly moving and refused gait belt. Able to safely get her into a wheelchair to wheel her around. Patient was getting increasing violent with staff and verbally abusive. Patient is very emotional and tearful.   Zyprexa given at 1715, 1x dose of Geodon given at 1826, ativan given at 1940.     Pt is on a 1:1 with kanwal cueva

## 2023-11-12 NOTE — PROGRESS NOTES
"PRIMARY DIAGNOSIS: \"GENERIC\" NURSING  OUTPATIENT/OBSERVATION GOALS TO BE MET BEFORE DISCHARGE:  ADLs back to baseline: Yes    Activity and level of assistance: Up with standby assistance.    Pain status: Pain free.    Return to near baseline physical activity: Yes     Discharge Planner Nurse   Safe discharge environment identified: Yes  Barriers to discharge: Yes       Entered by: Tiffany Foster RN 11/12/2023 3:42 PM     Please review provider order for any additional goals.   Nurse to notify provider when observation goals have been met and patient is ready for discharge.  "

## 2023-11-13 NOTE — PROGRESS NOTES
Care Management Discharge Note    Discharge Date: 11/13/2023       Discharge Disposition: Home    Discharge Services: None    Discharge DME: None    Discharge Transportation:      Private pay costs discussed: Not applicable    Does the patient's insurance plan have a 3 day qualifying hospital stay waiver?  No    PAS Confirmation Code: N/A  Patient/family educated on Medicare website which has current facility and service quality ratings: yes    Education Provided on the Discharge Plan: Yes  Persons Notified of Discharge Plans: Pt and    Patient/Family in Agreement with the Plan: yes    Handoff Referral Completed: Yes    Additional Information:  Pt discharging home today with  and cared for by .  No CM needs identified.  SWCM called and spoke with Pt's  Jose and denies need.  Family to transport.     GIANNA Junior

## 2023-11-13 NOTE — PLAN OF CARE
Problem: Dementia Signs/Symptoms  Goal: Improved Behavioral Control (Dementia Signs/Symptoms)  Outcome: Progressing  Goal: Improved Mood Symptoms (Dementia Signs/Symptoms)  Outcome: Progressing  Goal: Optimized Cognitive Function (Dementia Signs/Symptoms)  Outcome: Progressing  Goal: Optimized Oral Intake (Dementia Signs/Symptoms)  Outcome: Progressing  Goal: Improved Sleep (Dementia Signs/Symptoms)  Outcome: Progressing  Goal: Enhanced Social or Functional Skills and Ability (Dementia Signs/Symptoms)  Outcome: Progressing     Problem: Suicide Risk  Goal: Absence of Self-Harm  Outcome: Progressing  Intervention: Assess Risk to Self and Maintain Safety  Recent Flowsheet Documentation  Taken 11/12/2023 1730 by Lyubov Pastor RN  Enhanced Safety Measures:    at bedside   room near unit station  Taken 11/12/2023 1635 by Lyubov Pastor RN  Enhanced Safety Measures:  at bedside  Taken 11/12/2023 1515 by Lyubov Pastor, RN  Enhanced Safety Measures:    at bedside   family to remain at bedside

## 2023-11-13 NOTE — PLAN OF CARE
Problem: Adult Inpatient Plan of Care  Goal: Optimal Comfort and Wellbeing  Outcome: Progressing  Goal: Readiness for Transition of Care  Outcome: Progressing     Problem: Dementia Signs/Symptoms  Goal: Improved Behavioral Control (Dementia Signs/Symptoms)  11/12/2023 2200 by Lyubov Pastor RN  Outcome: Progressing  11/12/2023 1816 by Lyubov Pastor RN  Outcome: Progressing  Goal: Improved Mood Symptoms (Dementia Signs/Symptoms)  11/12/2023 2200 by Lyubov Pastor RN  Outcome: Progressing  11/12/2023 1816 by Lyubov Pastor RN  Outcome: Progressing  Goal: Optimized Cognitive Function (Dementia Signs/Symptoms)  11/12/2023 2200 by Lyubov Pastor RN  Outcome: Progressing  11/12/2023 1816 by Lyubov Pastor RN  Outcome: Progressing  Goal: Optimized Oral Intake (Dementia Signs/Symptoms)  11/12/2023 2200 by Lyubov Pastor RN  Outcome: Progressing  11/12/2023 1816 by Lyubov Pastor RN  Outcome: Progressing  Goal: Improved Sleep (Dementia Signs/Symptoms)  11/12/2023 2200 by Lyubov Pastor RN  Outcome: Progressing  11/12/2023 1816 by Lyubov Pastor RN  Outcome: Progressing  Goal: Enhanced Social or Functional Skills and Ability (Dementia Signs/Symptoms)  11/12/2023 2200 by Lyubov Pastor RN  Outcome: Progressing  11/12/2023 1816 by Lyubov Pastor RN  Outcome: Progressing     Problem: Suicide Risk  Goal: Absence of Self-Harm  Outcome: Progressing  Intervention: Assess Risk to Self and Maintain Safety  Recent Flowsheet Documentation  Taken 11/12/2023 2110 by Lyubov Pastor RN  Enhanced Safety Measures:   room near unit station    at bedside  Taken 11/12/2023 1955 by Lyubov Pastor RN  Enhanced Safety Measures:   room near unit station    at bedside  Taken 11/12/2023 1920 by Lyubov Pastor RN  Enhanced Safety Measures:   room near unit station    at bedside  Taken 11/12/2023 1730 by Lyubov Pastor RN  Enhanced Safety Measures:    at  bedside   room near unit station  Taken 11/12/2023 1635 by Lyubov Pastor, RN  Enhanced Safety Measures:  at bedside  Taken 11/12/2023 1515 by Lyubov Pastor, RN  Enhanced Safety Measures:    at bedside   family to remain at bedside

## 2023-11-13 NOTE — PLAN OF CARE
PRIMARY DIAGNOSIS: ACUTE PAIN  OUTPATIENT/OBSERVATION GOALS TO BE MET BEFORE DISCHARGE:  1. Pain Status: Pain free.    2. Return to near baseline physical activity: Yes    3. Cleared for discharge by consultants (if involved): Yes    Discharge Planner Nurse   Safe discharge environment identified: Yes  Barriers to discharge: No       Entered by: Leticia Woo RN 11/13/2023 2:01 AM     Please review provider order for any additional goals.   Nurse to notify provider when observation goals have been met and patient is ready for discharge.Goal Outcome Evaluation:    Pt sleep on and off throughout this night. Pt refused to allow to be assessed during this shift and have VS taken VS during this shift  at 0238 hours except for temperature. CNA was able to take BP, 1:1 sitter was implemented. Emotional support and active listening were also implemented. Warm blankets were given for comfort. Nourishment was offered.      Discharge plans are for 11.13.23 for 1619-7406 hours.  will .

## 2023-11-13 NOTE — DISCHARGE SUMMARY
[facility] MEDICINE  DISCHARGE SUMMARY     Primary Care Physician: Glenny Baron  Admission Date: 11/9/2023   Discharge Provider: Ila Vanegas MD Discharge Date: 11/13/2023   Diet: Orders Placed This Encounter      Regular Diet Adult      Diet      Code Status: Full Code   Activity: activity as tolerated   [facility]      Condition at Discharge: Stable      REASON FOR PRESENTATION(See Admission Note for Details)   Agitation    PRINCIPAL & ACTIVE DISCHARGE DIAGNOSES     Principal Problem:    Dementia with behavioral disturbance (H)  Active Problems:    Agitation  Incidental findings of pancreatic cystic lesion will need follow-up as outpatient.  Asymptomatic cholelithiasis  History of UTI  History of partial nephrectomy      SIGNIFICANT FINDINGS (Imaging, labs):     Most Recent 3 CBC's:  Recent Labs   Lab Test 11/09/23  1637 10/21/23  1117 10/20/23  0532   WBC 7.9 6.3 7.9   HGB 13.1 12.7 13.1   MCV 90 91 90    202 187      Most Recent 3 BMP's:  Recent Labs   Lab Test 11/13/23  0539 11/12/23  0705 11/11/23  1503 11/09/23  1718 10/23/23  0806 10/23/23  0557 10/21/23  1117   NA  --   --   --  142  --  144 142   POTASSIUM  --   --   --  3.8  --  3.9 4.1   CHLORIDE  --   --   --  104  --  107 103   CO2  --   --   --  27  --  28 29   BUN  --   --   --  16.6  --  15.3 16.0   CR 1.01* 1.00* 1.09* 1.09*  --  1.08* 1.14*   ANIONGAP  --   --   --  11  --  9 10   JOLLY  --   --   --  10.3*  --  9.1 9.5   GLC  --   --   --  110* 128* 93 101*     Most Recent 2 LFT's:  Recent Labs   Lab Test 11/09/23  1718 10/20/23  0441   AST 18 36   ALT 13 16   ALKPHOS 85 88   BILITOTAL 0.5 0.4     Most Recent TSH, T4 and A1c Labs:  Recent Labs   Lab Test 10/21/23  1117 10/20/23  0441   TSH 3.84 8.58*   T4  --  1.05     Results for orders placed or performed during the hospital encounter of 11/09/23   Head CT w/o contrast    Narrative    EXAM: CT HEAD W/O CONTRAST  LOCATION: [facility]  HOSPITAL  DATE: 11/9/2023    INDICATION: Confusion, agitation.  COMPARISON: Head CT 10/20/2023.  TECHNIQUE: Routine CT Head without IV contrast. Multiplanar reformats. Dose reduction techniques were used.    FINDINGS:  INTRACRANIAL CONTENTS: No intracranial hemorrhage, extraaxial collection, or mass effect.  No CT evidence of acute infarct. Mild presumed chronic small vessel ischemic changes. Moderate generalized volume loss. No hydrocephalus.     VISUALIZED ORBITS/SINUSES/MASTOIDS: No intraorbital abnormality. No paranasal sinus mucosal disease. No middle ear or mastoid effusion.    BONES/SOFT TISSUES: No acute abnormality.      Impression    IMPRESSION:  1.  No CT evidence for acute intracranial process.  2.  Brain atrophy and presumed chronic microvascular ischemic changes as above.   CT Chest/Abdomen/Pelvis w Contrast    Narrative    EXAM: CT CHEST/ABDOMEN/PELVIS W CONTRAST  LOCATION: [facility]  DATE: 11/9/2023    INDICATION: CHEST AND ABD PAIN  COMPARISON: 07/08/2019 CT pulmonary angiogram.  TECHNIQUE: CT scan of the chest, abdomen, and pelvis was performed following injection of IV contrast. Multiplanar reformats were obtained. Dose reduction techniques were used.   CONTRAST: Isovue 370 75mL    FINDINGS:   LUNGS AND PLEURA: Emphysematous change. Linear regions of atelectasis in the right middle and lower lobes.    MEDIASTINUM/AXILLAE: Normal.    CORONARY ARTERY CALCIFICATION: None.    HEPATOBILIARY: There is a large calcified gallstone present. Gallbladder wall thickening. Gallbladder ultrasound recommended for further evaluation.    PANCREAS: Normal.    SPLEEN: Normal.    ADRENAL GLANDS: 1.5 cm low-attenuation nodule left adrenal gland compatible with adenoma. This is stable. Subcentimeter presumed adenoma right adrenal gland.    KIDNEYS/BLADDER: Partial nephrectomy upper pole right kidney.    BOWEL: Normal.    LYMPH NODES: Normal.    VASCULATURE: Unremarkable.    PELVIC ORGANS:  Hysterectomy.    MUSCULOSKELETAL: Normal.      Impression    IMPRESSION:  1.  Large calcified gallstone. Gallbladder wall thickening. Gallbladder ultrasound recommended in further evaluation.    2.  Emphysema.    3.  Partial right upper nephrectomy.     4.  Low-attenuation bilateral adrenal nodules compatible with adenomata.    5.  Hysterectomy.   US Abdomen Limited    Addendum: 11/9/2023    Addendum regarding follow-up for incidental pancreatic cystic lesion:   Recommend follow-up MRCP in 6 months per guidelines below.    REFERENCE:  Revisions of international consensus [place] guidelines for the management of IPMN of the pancreas. Pancreatology 2017;17(5):738-753.    Largest cyst between 10-20 mm: CT or MRI/MRCP every 6 months for 1 year and then yearly for 2 years and then every 2 years if no change.         Narrative    EXAM: US ABDOMEN LIMITED  LOCATION: [facility]  DATE: 11/9/2023    INDICATION: gallstone, gallbladder edema  COMPARISON: CT performed same day  TECHNIQUE: Limited abdominal ultrasound.    FINDINGS:    GALLBLADDER: Gallbladder wall thickening, likely secondary to underdistention. Cholelithiasis. No pericholecystic fat stranding. Negative sonographic Charles's sign. Adenomyomatosis.    BILE DUCTS: No biliary dilatation. The common duct measures 4 mm.    LIVER: Normal parenchyma with smooth contour. No focal mass.    RIGHT KIDNEY: No hydronephrosis.    PANCREAS: 11 mm cystic lesion adjacent to the body of the pancreas.    No ascites.      Impression    IMPRESSION:  1.  Cholelithiasis without sonographic evidence of acute cholecystitis.            PENDING LABS         PROCEDURES ( this hospitalization only)          RECOMMENDATION FOR F/U VISIT       DISPOSITION     Home    SUMMARY OF HOSPITAL COURSE:    57-year-old lady past medical history significant for dementia, lives at home with her  brought into the emergency room with complaints of agitation, behavioral  changes.  She was admitted, seen by psychiatry, started on Aricept, she expresses significant depressive symptoms with persistent crying, anxiety, Remeron discontinued, started on Zoloft.  Required one-to-one sitter.  Ultimately, behaviors improved with Zyprexa.  She is discharged home with her family.  She will follow-up with psychiatry, neurology as outpatient.    Incidental findings of cholelithiasis, pancreatic cystic lesion measuring 11 mm.  Will need follow-up with GI as outpatient.    Discharge Medications with Med changes:        Review of your medicines        START taking        Dose / Directions   donepezil 5 MG tablet  Commonly known as: ARICEPT      Dose: 5 mg  Take 1 tablet (5 mg) by mouth at bedtime  Quantity: 30 tablet  Refills: 0     sertraline 25 MG tablet  Commonly known as: ZOLOFT  Used for: Current moderate episode of major depressive disorder without prior episode (H)      Dose: 25 mg  Take 1 tablet (25 mg) by mouth daily  Quantity: 30 tablet  Refills: 0            CONTINUE these medicines which may have CHANGED, or have new prescriptions. If we are uncertain of the size of tablets/capsules you have at home, strength may be listed as something that might have changed.        Dose / Directions   * melatonin 3 MG tablet  This may have changed: Another medication with the same name was added. Make sure you understand how and when to take each.      Dose: 3 mg  Take 1 tablet (3 mg) by mouth daily (with dinner)  Quantity: 30 tablet  Refills: 3     * melatonin 3 MG tablet  This may have changed: You were already taking a medication with the same name, and this prescription was added. Make sure you understand how and when to take each.      Dose: 3 mg  Take 1 tablet (3 mg) by mouth nightly as needed for sleep  Quantity: 30 tablet  Refills: 0     * OLANZapine 5 MG tablet  Commonly known as: zyPREXA  This may have changed:   how much to take  when to take this      Dose: 2.5-5 mg  Take 0.5-1 tablets  (2.5-5 mg) by mouth 2 times daily as needed  Quantity: 30 tablet  Refills: 0     * OLANZapine 5 MG tablet  Commonly known as: zyPREXA  This may have changed: Another medication with the same name was changed. Make sure you understand how and when to take each.      Dose: 5 mg  Take 5 mg by mouth at bedtime  Refills: 0           * This list has 4 medication(s) that are the same as other medications prescribed for you. Read the directions carefully, and ask your doctor or other care provider to review them with you.                CONTINUE these medicines which have NOT CHANGED        Dose / Directions   aspirin 81 MG EC tablet      Dose: 81 mg  Take 81 mg by mouth daily  Refills: 0     cholecalciferol 50 MCG (2000 UT) tablet      Dose: 1 capsule  Take 1 capsule by mouth daily  Refills: 0     cyanocobalamin 500 MCG tablet  Commonly known as: VITAMIN B-12      Dose: 500 mcg  Take 500 mcg by mouth daily  Refills: 0     memantine 10 MG tablet  Commonly known as: NAMENDA      Dose: 10 mg  Take 10 mg by mouth 2 times daily  Refills: 0     MULTIVITAL PO      Dose: 1 tablet  Take 1 tablet by mouth daily  Refills: 0     omeprazole 20 MG DR capsule  Commonly known as: PriLOSEC      Dose: 20 mg  Take 20 mg by mouth daily before breakfast  Refills: 0     prazosin 1 MG capsule  Commonly known as: MINIPRESS  Used for: Nightmare      Dose: 1 mg  Take 1 capsule (1 mg) by mouth at bedtime  Quantity: 30 capsule  Refills: 3     thiamine 100 MG tablet  Commonly known as: B-1  Used for: Thiamine deficiency      Dose: 100 mg  Take 1 tablet (100 mg) by mouth daily  Quantity: 30 tablet  Refills: 1            STOP taking      mirtazapine 15 MG tablet  Commonly known as: REMERON                  Where to get your medicines        These medications were sent to [id number] - [place], [place] - [address]  [address] [place] [address]      Hours: Ok's by sydney STINSON 9/20/06 Phone: [phone]   donepezil  "5 MG tablet  melatonin 3 MG tablet  OLANZapine 5 MG tablet  sertraline 25 MG tablet              Rationale for medication changes:    Aricept, Zyprexa for dementia  Zoloft for depressive symptoms        Consults   Psychiatry      Immunizations given this encounter     [unfilled]      Discharge Procedure Orders   Reason for your hospital stay   Order Comments: Dementia with behavioral disturbance     Activity   Order Comments: Your activity upon discharge: activity as tolerated     Order Specific Question Answer Comments   Is discharge order? Yes      Follow-up and recommended labs and tests    Order Comments: Follow up with primary care provider, Glenny Baron, within 7 days,   Follow up with psychiatry in 1-2 weeks, neurology in 2 weeks     Diet   Order Comments: Follow this diet upon discharge: Orders Placed This Encounter      Regular Diet Adult     Order Specific Question Answer Comments   Is discharge order? Yes      Examination     Vital Signs in last 24 hours:   Vital signs:  Temp: 98.6  F (37  C) Temp src: Oral BP: 123/68 Pulse: 85   Resp: 16 SpO2: 92 % O2 Device: None (Room air)     Weight: 88.9 kg (196 lb)  Estimated body mass index is 31.64 kg/m  as calculated from the following:    Height as of 8/7/23: 1.676 m (5' 6\").    Weight as of this encounter: 88.9 kg (196 lb).       Pertinent positives on exam:  Neuro: Able to move all her extremities, she is  oriented to self, no focal deficits.    Please see EMR for more detailed significant labs, imaging, consultant notes etc.  Total time spent on discharge: > 35 minutes    Ila Vanegas MD   [facility] Hospitalist Service: Ph:[phone]    CC:Glenny Baron      "

## 2023-11-13 NOTE — PLAN OF CARE
PRIMARY DIAGNOSIS: Dementia  OUTPATIENT/OBSERVATION GOALS TO BE MET BEFORE DISCHARGE:  ADLs back to baseline: Yes    Activity and level of assistance: Up with standby assistance.    Pain status: Pain free.    Return to near baseline physical activity: Yes     Discharge Planner Nurse   Safe discharge environment identified: Yes  Barriers to discharge: No - The pt was cooperative and took all her medications this morning, took them whole with water. Alert to self and place.     Pt discharged with her  Jamie and a neighbor at 1000. New medications reviewed with the . The patient was responsive and not paranoid with Jamie during the discharge teaching.        Entered by: Regino Morse RN 11/13/2023 10:11 AM     Please review provider order for any additional goals.   Nurse to notify provider when observation goals have been met and patient is ready for discharge.

## 2023-11-14 NOTE — PROGRESS NOTES
[facility]    Background: Transitional Care Management program identified per system criteria and reviewed by [facility] team for possible outreach.    Assessment: Upon chart review, [facility] Team member will not proceed with patient outreach related to this episode of Transitional Care Management program due to reason below:    Chart information is too lacking for call. Course is missing, all intake and reason for admit information is missing.    Plan: Transitional Care Management episode addressed appropriately per reason noted above.      Alexa Hunter MA  [facility] Resource [place], [facility]    *Connected Care Resource Team does NOT follow patient ongoing. Referrals are identified based on internal discharge reports and the outreach is to ensure patient has an understanding of their discharge instructions.